# Patient Record
Sex: FEMALE | Race: WHITE | URBAN - METROPOLITAN AREA
[De-identification: names, ages, dates, MRNs, and addresses within clinical notes are randomized per-mention and may not be internally consistent; named-entity substitution may affect disease eponyms.]

---

## 2018-02-12 ENCOUNTER — OFFICE VISIT (OUTPATIENT)
Dept: ORTHOPEDICS | Facility: CLINIC | Age: 73
End: 2018-02-12
Payer: COMMERCIAL

## 2018-02-12 ENCOUNTER — RADIANT APPOINTMENT (OUTPATIENT)
Dept: GENERAL RADIOLOGY | Facility: CLINIC | Age: 73
End: 2018-02-12
Attending: FAMILY MEDICINE
Payer: COMMERCIAL

## 2018-02-12 VITALS
HEIGHT: 64 IN | BODY MASS INDEX: 30.73 KG/M2 | WEIGHT: 180 LBS | SYSTOLIC BLOOD PRESSURE: 152 MMHG | DIASTOLIC BLOOD PRESSURE: 69 MMHG

## 2018-02-12 DIAGNOSIS — M75.01 ADHESIVE CAPSULITIS OF RIGHT SHOULDER: ICD-10-CM

## 2018-02-12 DIAGNOSIS — M25.511 PAIN IN JOINT OF RIGHT SHOULDER: ICD-10-CM

## 2018-02-12 DIAGNOSIS — R29.898 SHOULDER WEAKNESS: ICD-10-CM

## 2018-02-12 DIAGNOSIS — M25.511 PAIN IN JOINT OF RIGHT SHOULDER: Primary | ICD-10-CM

## 2018-02-12 PROCEDURE — 73030 X-RAY EXAM OF SHOULDER: CPT | Mod: RT

## 2018-02-12 PROCEDURE — 99203 OFFICE O/P NEW LOW 30 MIN: CPT | Performed by: FAMILY MEDICINE

## 2018-02-12 NOTE — MR AVS SNAPSHOT
After Visit Summary   2/12/2018    Yvette Smallwood    MRN: [id number]           Patient Information     Date Of Birth          1945        Visit Information        Provider Department      2/12/2018 2:40 PM Fred Cramer MD [facility]        Today's Diagnoses     Pain in joint of right shoulder    -  1    Shoulder weakness        Adhesive capsulitis of right shoulder           Follow-ups after your visit        Your next 10 appointments already scheduled     Feb 13, 2018  2:00 PM CST   (Arrive by 1:45 PM)   MR SHOULDER RIGHT W/O CONTRAST with [id number]   [facility] ([facility])    [address]  [facility] [id number] [phone]           Take your medicines as usual, unless your doctor tells you not to. Bring a list of your current medicines to your exam (including vitamins, minerals and over-the-counter drugs). Also bring the results of similar scans you may have had.  Please remove any body piercings and hair extensions before you arrive.  Follow your doctor s orders. If you do not, we may have to postpone your exam.  You may or may not receive IV contrast for this exam pending the discretion of the Radiologist.  You do not need to do anything special to prepare.  The MRI machine uses a strong magnet. Please wear clothes without metal (snaps, zippers). A sweatsuit works well, or we may give you a hospital gown.   **IMPORTANT** THE INSTRUCTIONS BELOW ARE ONLY FOR THOSE PATIENTS WHO HAVE BEEN PRESCRIBED SEDATION OR GENERAL ANESTHESIA DURING THEIR MRI PROCEDURE:  IF YOUR DOCTOR PRESCRIBED ORAL SEDATION (take medicine to help you relax during your exam):   You must get the medicine from your doctor (oral medication) before you arrive. Bring the medicine to the exam. Do not take it at home. You ll be told when to take it upon arriving for your exam.   Arrive one hour early. Bring someone who can take  you home after the test. Your medicine will make you sleepy. After the exam, you may not drive, take a bus or take a taxi by yourself.  IF YOUR DOCTOR PRESCRIBED IV SEDATION:   Arrive one hour early. Bring someone who can take you home after the test. Your medicine will make you sleepy. After the exam, you may not drive, take a bus or take a taxi by yourself.   No eating 6 hours before your exam. You may have clear liquids up until 4 hours before your exam. (Clear liquids include water, clear tea, black coffee and fruit juice without pulp.)  IF YOUR DOCTOR PRESCRIBED ANESTHESIA (be asleep for your exam):   Arrive 1 1/2 hours early. Bring someone who can take you home after the test. You may not drive, take a bus or take a taxi by yourself.   No eating 8 hours before your exam. You may have clear liquids up until 4 hours before your exam. (Clear liquids include water, clear tea, black coffee and fruit juice without pulp.)   You will spend four to five hours in the recovery room.  Please call the Imaging Department at your exam site with any questions.              Future tests that were ordered for you today     Open Future Orders        Priority Expected Expires Ordered    MR Shoulder Right w/o Contrast Routine  2/12/2019 2/12/2018            Who to contact     If you have questions or need follow up information about today's clinic visit or your schedule please contact [facility] directly at [phone].  Normal or non-critical lab and imaging results will be communicated to you by MyChart, letter or phone within 4 business days after the clinic has received the results. If you do not hear from us within 7 days, please contact the clinic through [employer] or phone. If you have a critical or abnormal lab result, we will notify you by phone as soon as possible.  Submit refill requests through [employer] or call your pharmacy and they will forward the refill request to us. Please allow 3 business days  "for your refill to be completed.          Additional Information About Your Visit        Billingstreethart Information     [employer] lets you send messages to your doctor, view your test results, renew your prescriptions, schedule appointments and more. To sign up, go to [email or web address] . Click on \"Log in\" on the left side of the screen, which will take you to the Welcome page. Then click on \"Sign up Now\" on the right side of the page.     You will be asked to enter the access code listed below, as well as some personal information. Please follow the directions to create your username and password.     Your access code is: [email or web address]  Expires: 2018  6:26 PM     Your access code will  in 90 days. If you need help or a new code, please call your [place] clinic or [phone].        Care EveryWhere ID     This is your Care EveryWhere ID. This could be used by other organizations to access your [place] medical records  [id number]        Your Vitals Were     Height BMI (Body Mass Index)                5' 4\" (1.626 m) 30.9 kg/m2           Blood Pressure from Last 3 Encounters:   18 152/69    Weight from Last 3 Encounters:   18 180 lb (81.6 kg)               Primary Care Provider Fax #    Physician No Ref-Primary [phone]       No address on file        Equal Access to Services     REYNA CURRY AH: Hadii gianna whitt hadasho Sojimenezali, waaxda luqadaha, qaybta kaalmada michaelda, joel delarosa . So [facility] [phone].    ATENCIÓN: Si habla español, tiene a engle disposición servicios gratuitos de asistencia lingüística. Llame al [phone].    We comply with applicable federal civil rights laws and [place] laws. We do not discriminate on the basis of race, color, national origin, age, disability, sex, sexual orientation, or gender identity.            Thank you!     Thank you for choosing [facility]  for your care. Our goal is always to provide you with " excellent care. Hearing back from our patients is one way we can continue to improve our services. Please take a few minutes to complete the written survey that you may receive in the mail after your visit with us. Thank you!             Your Updated Medication List - Protect others around you: Learn how to safely use, store and throw away your medicines at www.disposemymeds.org.          This list is accurate as of 2/12/18  6:26 PM.  Always use your most recent med list.                   Brand Name Dispense Instructions for use Diagnosis    METFORMIN HCL PO           MULTIVITAMIN ADULT PO

## 2018-02-12 NOTE — PROGRESS NOTES
"[facility]   Clinic Visit s Feb 12, 2018    PCP: No Ref-Primary, Physician      Yvette is a 72 year old female who is seen as self referral for   Chief Complaint   Patient presents with     Shoulder Pain       Injury: Patient describes injury as tipping over and fell directly on right shoulder.      right-hand dominant    Location of Pain: right shoulder lateral, nonradiating   Duration of Pain: 1 month(s)  Rating of Pain at worst: 5/10  Rating of Pain Currently: 0/10  Pain is better with: rest   Pain is worse with: ADL's, lifting and driving   Treatment so far consists of: no treatment tried to date  Associated symptoms: no distal numbness or tingling; denies swelling or warmth  Recent imaging completed: No recent imaging completed.  Prior History of related problems: none    Social History: retired     PMH, PSHX, FMHX, SOCHX all reviewed and are unremarkable or noncontributory to today's visit.  See intake form for details.    Review of Systems   Musculoskeletal: Positive for joint pain.         Physical Exam  /69  Ht 5' 4\" (1.626 m)  Wt 180 lb (81.6 kg)  BMI 30.9 kg/m2  Constitutional:well-developed, well-nourished, and in no distress.   Cardiovascular: Intact distal pulses.    Neurological: alert. Gait Abnormal:   Antalgic gait  Skin: Skin is warm and dry.   Psychiatric: Mood and affect normal.   Respiratory: unlabored, speaks in full sentences  Lymph: no LAD, no lymphangitis      Left Shoulder Exam     Tenderness   None    Range of Motion   Active Abduction:                       170  Passive Abduction:                    170  Extension:                                  Normal  Forward Flexion:                        170  External Rotation:                      Normal  Internal Rotation 0 degrees:      Normal  Internal Rotation 90 degrees:    Normal    Muscle Strength   Abduction:            5/5  Internal Rotation:  5/5  External Rotation: 5/5  Supraspinatus:     " 5/5  Subscapularis:     5/5  Biceps:                 5/5    Tests   Impingement:   Negative  Carvajal:          Negative  Cross Arm:      Negative  Drop Arm:        Negative  Apprehension: Negative  Sulcus:            Negative    Right Shoulder Exam     Range of Motion   Active Abduction:                       60  Passive Abduction:                    60  Forward Flexion:                        80          X-ray images Ordered and independently reviewed by me in the office today with the patient. X-ray shows: mild degenerative changes, high riding humerus.    Recent Results (from the past 24 hour(s))   XR Shoulder Right G/E 3 Views    Narrative    RIGHT SHOULDER THREE OR MORE VIEWS  2/12/2018  3:36 PM     HISTORY: Pain in joint of right shoulder.    COMPARISON: None.      Impression    IMPRESSION: No acute bony abnormality. Mild hypertrophic degenerative  change at the acromioclavicular joint.    RADHA AVILA MD     ASSESSMENT/PLAN    ICD-10-CM    1. Pain in joint of right shoulder M25.511 XR Shoulder Right G/E 3 Views     MR Shoulder Right w/o Contrast   2. Shoulder weakness R29.898 MR Shoulder Right w/o Contrast   3. Adhesive capsulitis of right shoulder M75.01 MR Shoulder Right w/o Contrast     Una has substantial weakness of the right arm but also significant loss of motion.  Given onset related to trauma, acute rotator cuff tear is a strong possibility.  Muscle range of motion could be from disuse or secondary frozen shoulder.  Primary frozen shoulder also possibility.  MRI recommended to further evaluate for rotator cuff tear, will call with results as I suspect surgical consultation will be necessary.  Patient indicates understanding

## 2018-02-12 NOTE — LETTER
"    2/12/2018         RE: Yvette Smallwood  [address] [facility] [address]        Dear Colleague,    Thank you for referring your patient, Yvette Smallwood, to the [facility]. Please see a copy of my visit note below.    [facility]   Clinic Visit s Feb 12, 2018    PCP: No Ref-Primary, Physician      Yvette is a 72 year old female who is seen as self referral for   Chief Complaint   Patient presents with     Shoulder Pain       Injury: Patient describes injury as tipping over and fell directly on right shoulder.      right-hand dominant    Location of Pain: right shoulder lateral, nonradiating   Duration of Pain: 1 month(s)  Rating of Pain at worst: 5/10  Rating of Pain Currently: 0/10  Pain is better with: rest   Pain is worse with: ADL's, lifting and driving   Treatment so far consists of: no treatment tried to date  Associated symptoms: no distal numbness or tingling; denies swelling or warmth  Recent imaging completed: No recent imaging completed.  Prior History of related problems: none    Social History: retired     PMH, PSHX, FMHX, SOCHX all reviewed and are unremarkable or noncontributory to today's visit.  See intake form for details.    Review of Systems   Musculoskeletal: Positive for joint pain.         Physical Exam  /69  Ht 5' 4\" (1.626 m)  Wt 180 lb (81.6 kg)  BMI 30.9 kg/m2  Constitutional:well-developed, well-nourished, and in no distress.   Cardiovascular: Intact distal pulses.    Neurological: alert. Gait Abnormal:   Antalgic gait  Skin: Skin is warm and dry.   Psychiatric: Mood and affect normal.   Respiratory: unlabored, speaks in full sentences  Lymph: no LAD, no lymphangitis      Left Shoulder Exam     Tenderness   None    Range of Motion   Active Abduction:                       170  Passive Abduction:                    170  Extension:                                  Normal  Forward Flexion:                        " 170  External Rotation:                      Normal  Internal Rotation 0 degrees:      Normal  Internal Rotation 90 degrees:    Normal    Muscle Strength   Abduction:            5/5  Internal Rotation:  5/5  External Rotation: 5/5  Supraspinatus:     5/5  Subscapularis:     5/5  Biceps:                 5/5    Tests   Impingement:   Negative  Carvajal:          Negative  Cross Arm:      Negative  Drop Arm:        Negative  Apprehension: Negative  Sulcus:            Negative    Right Shoulder Exam     Range of Motion   Active Abduction:                       60  Passive Abduction:                    60  Forward Flexion:                        80          X-ray images Ordered and independently reviewed by me in the office today with the patient. X-ray shows: mild degenerative changes, high riding humerus.    Recent Results (from the past 24 hour(s))   XR Shoulder Right G/E 3 Views    Narrative    RIGHT SHOULDER THREE OR MORE VIEWS  2/12/2018  3:36 PM     HISTORY: Pain in joint of right shoulder.    COMPARISON: None.      Impression    IMPRESSION: No acute bony abnormality. Mild hypertrophic degenerative  change at the acromioclavicular joint.    RADHA AVILA MD     ASSESSMENT/PLAN    ICD-10-CM    1. Pain in joint of right shoulder M25.511 XR Shoulder Right G/E 3 Views     MR Shoulder Right w/o Contrast   2. Shoulder weakness R29.898 MR Shoulder Right w/o Contrast   3. Adhesive capsulitis of right shoulder M75.01 MR Shoulder Right w/o Contrast     Una has substantial weakness of the right arm but also significant loss of motion.  Given onset related to trauma, acute rotator cuff tear is a strong possibility.  Muscle range of motion could be from disuse or secondary frozen shoulder.  Primary frozen shoulder also possibility.  MRI recommended to further evaluate for rotator cuff tear, will call with results as I suspect surgical consultation will be necessary.  Patient indicates understanding    Again, thank you for  allowing me to participate in the care of your patient.        Sincerely,        Fred Cramer MD

## 2018-02-13 ENCOUNTER — HOSPITAL ENCOUNTER (OUTPATIENT)
Dept: MRI IMAGING | Facility: CLINIC | Age: 73
Discharge: HOME | End: 2018-02-13
Attending: FAMILY MEDICINE | Admitting: FAMILY MEDICINE
Payer: MEDICARE

## 2018-02-13 DIAGNOSIS — M25.511 PAIN IN JOINT OF RIGHT SHOULDER: ICD-10-CM

## 2018-02-13 DIAGNOSIS — M75.01 ADHESIVE CAPSULITIS OF RIGHT SHOULDER: ICD-10-CM

## 2018-02-13 DIAGNOSIS — R29.898 SHOULDER WEAKNESS: ICD-10-CM

## 2018-02-13 PROCEDURE — 73221 MRI JOINT UPR EXTREM W/O DYE: CPT | Mod: RT

## 2018-02-19 ENCOUNTER — TELEPHONE (OUTPATIENT)
Dept: ORTHOPEDICS | Facility: CLINIC | Age: 73
End: 2018-02-19

## 2018-02-19 DIAGNOSIS — S46.011A TRAUMATIC ROTATOR CUFF TEAR, RIGHT, INITIAL ENCOUNTER: Primary | ICD-10-CM

## 2018-02-19 NOTE — TELEPHONE ENCOUNTER
Patient called inquiring about results of R shoulder MRI.    Plan for results from last OV: call patient  Preferred contact number: Home    IMPRESSION:   1. Full-thickness tear of essentially the entire distal supraspinatus  tendon. There is prominent tendinosis of the adjacent infraspinatus.  2. Prominent degenerative changes of the acromioclavicular joint with  mild degenerative changes of the glenohumeral joint.    Please advise.     NEIL Acosta RN

## 2018-02-19 NOTE — TELEPHONE ENCOUNTER
Notified of RCT, slight retraction, given acute onset suspect acute tear, repair a reasonable option, referred to surgery, pt agrees.

## 2018-02-21 ENCOUNTER — OFFICE VISIT (OUTPATIENT)
Dept: ORTHOPEDICS | Facility: CLINIC | Age: 73
End: 2018-02-21
Payer: COMMERCIAL

## 2018-02-21 ENCOUNTER — TELEPHONE (OUTPATIENT)
Dept: ORTHOPEDICS | Facility: CLINIC | Age: 73
End: 2018-02-21

## 2018-02-21 VITALS
DIASTOLIC BLOOD PRESSURE: 68 MMHG | HEIGHT: 64 IN | BODY MASS INDEX: 30.73 KG/M2 | SYSTOLIC BLOOD PRESSURE: 142 MMHG | WEIGHT: 180 LBS

## 2018-02-21 DIAGNOSIS — M75.121 COMPLETE TEAR OF RIGHT ROTATOR CUFF: Primary | ICD-10-CM

## 2018-02-21 PROCEDURE — 99204 OFFICE O/P NEW MOD 45 MIN: CPT | Performed by: ORTHOPAEDIC SURGERY

## 2018-02-21 RX ORDER — ASPIRIN 81 MG/1
162 TABLET ORAL DAILY
COMMUNITY
Start: 2016-09-29

## 2018-02-21 RX ORDER — GLIPIZIDE 5 MG/1
5 TABLET ORAL
COMMUNITY
Start: 2017-10-07

## 2018-02-21 NOTE — TELEPHONE ENCOUNTER
Scheduled surgery for Right shoulder open Decompression, Distal Clavicle Resection, Rotator cuff tear repair on 3/20/2018 with Dr. Raygoza @ [facility] @ 12:00.  Surgery education packet provided to patient.

## 2018-02-21 NOTE — NURSING NOTE
"Chief Complaint   Patient presents with     Shoulder Pain     Right shoulder, rotator cuff tear       Initial /68 (BP Location: Right arm, Patient Position: Sitting, Cuff Size: Adult Regular)  Ht 5' 4\" (1.626 m)  Wt 180 lb (81.6 kg)  BMI 30.9 kg/m2 Estimated body mass index is 30.9 kg/(m^2) as calculated from the following:    Height as of this encounter: 5' 4\" (1.626 m).    Weight as of this encounter: 180 lb (81.6 kg).  Medication Reconciliation: complete    "

## 2018-02-21 NOTE — LETTER
2/21/2018         RE: Yvette Smallwood  [address] [facility] [address]        Dear Colleague,    Thank you for referring your patient, Yvette Smallwood, to the [facility]. Please see a copy of my visit note below.    HISTORY OF PRESENT ILLNESS:    Yvette Smallwood is a 72 year old female who is seen in consultation at the request of Dr. Cramer for right shoulder, rotator cuff tear.    Present symptoms: DOI: 1/13/2018 ~ 5.5 weeks ago.  AILIN: turned to the side and fell over landing on the right shoulder.  Pt states pain is in the upper arm, can not raise her arm.  States she doesn't have much pain with it but she is lacking motion.  She was not aware of having any issues with that shoulder prior to the fall injury. She is right-hand dominant. She denies radiculopathy type of symptoms.  Treatments tried to this point: MRI, ibuprofen  Orthopedic PMH: no ortho surgeries     No past medical history on file.  Diabetes    History reviewed. No pertinent surgical history. None    Family History   Problem Relation Age of Onset     DIABETES Maternal Grandfather        Social History     Social History     Marital status:      Spouse name: N/A     Number of children: N/A     Years of education: N/A     Occupational History     Not on file.     Social History Main Topics     Smoking status: Never Smoker     Smokeless tobacco: Never Used     Alcohol use No     Drug use: Not on file     Sexual activity: Not on file     Other Topics Concern     Not on file     Social History Narrative       Current Outpatient Prescriptions   Medication Sig Dispense Refill     glipiZIDE (GLUCOTROL) 5 MG tablet Take 5 mg by mouth       cyanocobalamin (RA VITAMIN B-12 TR) 1000 MCG TBCR Take 1,000 mcg by mouth       metFORMIN (GLUCOPHAGE) 1000 MG tablet Take 1,000 mg by mouth       aspirin EC 81 MG EC tablet Take 81 mg by mouth       VITAMIN E BLEND PO        Ascorbic Acid (VITAMIN C PO)        Multiple  "Vitamins-Minerals (MULTIVITAMIN ADULT PO)        [DISCONTINUED] METFORMIN HCL PO          No Known Allergies    REVIEW OF SYSTEMS:  CONSTITUTIONAL:  NEGATIVE for fever, chills, change in weight  INTEGUMENTARY/SKIN:  NEGATIVE for worrisome rashes, moles or lesions  EYES:  NEGATIVE for vision changes or irritation  ENT/MOUTH:  NEGATIVE for ear, mouth and throat problems  RESP:  NEGATIVE for significant cough or SOB  BREAST:  NEGATIVE for masses, tenderness or discharge  CV:  NEGATIVE for chest pain, palpitations or peripheral edema  GI:  diarrhea  :  Negative   MUSCULOSKELETAL:  See HPI above  NEURO:  NEGATIVE for weakness, dizziness or paresthesias  ENDOCRINE:  NEGATIVE for temperature intolerance, skin/hair changes  HEME/ALLERGY/IMMUNE:  NEGATIVE for bleeding problems  PSYCHIATRIC:  NEGATIVE for changes in mood or affect      PHYSICAL EXAM:  /68 (BP Location: Right arm, Patient Position: Sitting, Cuff Size: Adult Regular)  Ht 5' 4\" (1.626 m)  Wt 180 lb (81.6 kg)  BMI 30.9 kg/m2  Body mass index is 30.9 kg/(m^2).   GENERAL APPEARANCE: healthy, alert and no distress   HEENT: No apparent thyroid megaly. Clear sclera with normal ocular movement  RESPIRATORY: No labored breathing  SKIN: no suspicious lesions or rashes  NEURO: Normal strength and tone, mentation intact and speech normal  VASCULAR: Good pulses, and capillary refill   LYMPH: no lymphadenopathy   PSYCH:  mentation appears normal and affect normal/bright    MUSCULOSKELETAL:  She walks with a cane  Neck range of motion is well maintained  Full but painful range of motion, right  Positive impingement sign, right  Positive arm drop test, right  Negative belly press test, right  Virtually no significant strength of external rotation, right  Elbow range of motion is full  Distal neurovascular status is intact       ASSESSMENT:  Large rotator cuff tear with chronic impingement syndrome and acromioclavicular joint DJD  Mild glenohumeral joint " DJD    PLAN:  The images of MRI scan from February 12, 2018 were visualized. Findings were thoroughly explained. No Significant Supraspinatus atrophy is noted although diffuse tendinopathy/tendinosis is present.  We discussed the options of further observation versus decompression and repair versus reverse total shoulder arthroplasty.  With absence of significant muscular atrophy despite large rotator cuff tear, an exploration and attempt of rotator cuff tear repair with an augment and using abduction brace would be a very reasonable option.  Possibility of not being able to repair and long recovery time as well as potential complications were thoroughly discussed.  She wants to proceed with surgery in about a month So that her  was recovering from a stroke can drive. All the questions were answered.  The proposed surgery will include open shoulder decompression, distal clavicle resection, rotator cuff tear repair..      Imaging Interpretation:     Recent Results (from the past 744 hour(s))   XR Shoulder Right G/E 3 Views    Narrative    RIGHT SHOULDER THREE OR MORE VIEWS  2/12/2018  3:36 PM     HISTORY: Pain in joint of right shoulder.    COMPARISON: None.      Impression    IMPRESSION: No acute bony abnormality. Mild hypertrophic degenerative  change at the acromioclavicular joint.    RADHA AVILA MD   MR Shoulder Right w/o Contrast    Narrative    MRI RIGHT SHOULDER WITHOUT CONTRAST  2/13/2018 2:40 PM    HISTORY: Right shoulder pain and difficulty elevating the arm since  1/13/2018 after falling. Evaluate for rotator cuff tear.    COMPARISON: Radiographs on 2/12/2018.    TECHNIQUE: Multiplanar MR imaging was performed without contrast.    FINDINGS:  Osseous acromion outlet: There is a type II configuration of the  acromion with no significant lateral or anterior downsloping. There  are moderate to marked degenerative changes of the acromioclavicular  joint. The outlet is widely patent. No os  acromiale.    Rotator cuff: There is a full-thickness tear of what appears to be the  entire distal supraspinatus tendon at its insertion into the greater  tuberosity with retraction to the humeral head apex. There is  prominent tendinosis of the adjacent superior aspect of the distal  infraspinatus tendon. No other rotator cuff tear is seen. No fatty  atrophy of the musculature is demonstrated.     Labrum: No tear or other labral pathology is demonstrated.    Biceps tendon: The biceps tendon is in the bicipital groove. It is  intact and demonstrates normal signal.    Osseous structures and cartilaginous surfaces: No fracture or osseous  lesion is seen. There is moderate resorptive change in the greater  tuberosity. No other abnormal marrow signal intensity is seen. There  are mild degenerative changes in the glenohumeral articulation with  grade II chondromalacia.    Additional findings: There is a moderate-sized glenohumeral joint  effusion. The fluid extends through the rotator cuff tear into the  subacromial space and subdeltoid bursa. The adjacent soft tissues are  unremarkable.      Impression    IMPRESSION:   1. Full-thickness tear of essentially the entire distal supraspinatus  tendon. There is prominent tendinosis of the adjacent infraspinatus.  2. Prominent degenerative changes of the acromioclavicular joint with  mild degenerative changes of the glenohumeral joint.    MD Shiv SILVA MD  Department of Orthopedic Surgery        Disclaimer: This note consists of symbols derived from keyboarding, dictation and/or voice recognition software. As a result, there may be errors in the script that have gone undetected. Please consider this when interpreting information found in this chart.      Again, thank you for allowing me to participate in the care of your patient.        Sincerely,        Shiv Raygoza MD

## 2018-02-21 NOTE — PROGRESS NOTES
HISTORY OF PRESENT ILLNESS:    Yvette Smallwood is a 72 year old female who is seen in consultation at the request of Dr. Cramer for right shoulder, rotator cuff tear.    Present symptoms: DOI: 1/13/2018 ~ 5.5 weeks ago.  AILIN: turned to the side and fell over landing on the right shoulder.  Pt states pain is in the upper arm, can not raise her arm.  States she doesn't have much pain with it but she is lacking motion.  She was not aware of having any issues with that shoulder prior to the fall injury. She is right-hand dominant. She denies radiculopathy type of symptoms.  Treatments tried to this point: MRI, ibuprofen  Orthopedic PMH: no ortho surgeries     No past medical history on file.  Diabetes    History reviewed. No pertinent surgical history. None    Family History   Problem Relation Age of Onset     DIABETES Maternal Grandfather        Social History     Social History     Marital status:      Spouse name: N/A     Number of children: N/A     Years of education: N/A     Occupational History     Not on file.     Social History Main Topics     Smoking status: Never Smoker     Smokeless tobacco: Never Used     Alcohol use No     Drug use: Not on file     Sexual activity: Not on file     Other Topics Concern     Not on file     Social History Narrative       Current Outpatient Prescriptions   Medication Sig Dispense Refill     glipiZIDE (GLUCOTROL) 5 MG tablet Take 5 mg by mouth       cyanocobalamin (RA VITAMIN B-12 TR) 1000 MCG TBCR Take 1,000 mcg by mouth       metFORMIN (GLUCOPHAGE) 1000 MG tablet Take 1,000 mg by mouth       aspirin EC 81 MG EC tablet Take 81 mg by mouth       VITAMIN E BLEND PO        Ascorbic Acid (VITAMIN C PO)        Multiple Vitamins-Minerals (MULTIVITAMIN ADULT PO)        [DISCONTINUED] METFORMIN HCL PO          No Known Allergies    REVIEW OF SYSTEMS:  CONSTITUTIONAL:  NEGATIVE for fever, chills, change in weight  INTEGUMENTARY/SKIN:  NEGATIVE for worrisome rashes, moles or  "lesions  EYES:  NEGATIVE for vision changes or irritation  ENT/MOUTH:  NEGATIVE for ear, mouth and throat problems  RESP:  NEGATIVE for significant cough or SOB  BREAST:  NEGATIVE for masses, tenderness or discharge  CV:  NEGATIVE for chest pain, palpitations or peripheral edema  GI:  diarrhea  :  Negative   MUSCULOSKELETAL:  See HPI above  NEURO:  NEGATIVE for weakness, dizziness or paresthesias  ENDOCRINE:  NEGATIVE for temperature intolerance, skin/hair changes  HEME/ALLERGY/IMMUNE:  NEGATIVE for bleeding problems  PSYCHIATRIC:  NEGATIVE for changes in mood or affect      PHYSICAL EXAM:  /68 (BP Location: Right arm, Patient Position: Sitting, Cuff Size: Adult Regular)  Ht 5' 4\" (1.626 m)  Wt 180 lb (81.6 kg)  BMI 30.9 kg/m2  Body mass index is 30.9 kg/(m^2).   GENERAL APPEARANCE: healthy, alert and no distress   HEENT: No apparent thyroid megaly. Clear sclera with normal ocular movement  RESPIRATORY: No labored breathing  SKIN: no suspicious lesions or rashes  NEURO: Normal strength and tone, mentation intact and speech normal  VASCULAR: Good pulses, and capillary refill   LYMPH: no lymphadenopathy   PSYCH:  mentation appears normal and affect normal/bright    MUSCULOSKELETAL:  She walks with a cane  Neck range of motion is well maintained  Full but painful range of motion, right  Positive impingement sign, right  Positive arm drop test, right  Negative belly press test, right  Virtually no significant strength of external rotation, right  Elbow range of motion is full  Distal neurovascular status is intact       ASSESSMENT:  Large rotator cuff tear with chronic impingement syndrome and acromioclavicular joint DJD  Mild glenohumeral joint DJD    PLAN:  The images of MRI scan from February 12, 2018 were visualized. Findings were thoroughly explained. No Significant Supraspinatus atrophy is noted although diffuse tendinopathy/tendinosis is present.  We discussed the options of further observation versus " decompression and repair versus reverse total shoulder arthroplasty.  With absence of significant muscular atrophy despite large rotator cuff tear, an exploration and attempt of rotator cuff tear repair with an augment and using abduction brace would be a very reasonable option.  Possibility of not being able to repair and long recovery time as well as potential complications were thoroughly discussed.  She wants to proceed with surgery in about a month So that her  was recovering from a stroke can drive. All the questions were answered.  The proposed surgery will include open shoulder decompression, distal clavicle resection, rotator cuff tear repair..      Imaging Interpretation:     Recent Results (from the past 744 hour(s))   XR Shoulder Right G/E 3 Views    Narrative    RIGHT SHOULDER THREE OR MORE VIEWS  2/12/2018  3:36 PM     HISTORY: Pain in joint of right shoulder.    COMPARISON: None.      Impression    IMPRESSION: No acute bony abnormality. Mild hypertrophic degenerative  change at the acromioclavicular joint.    RADHA AVILA MD   MR Shoulder Right w/o Contrast    Narrative    MRI RIGHT SHOULDER WITHOUT CONTRAST  2/13/2018 2:40 PM    HISTORY: Right shoulder pain and difficulty elevating the arm since  1/13/2018 after falling. Evaluate for rotator cuff tear.    COMPARISON: Radiographs on 2/12/2018.    TECHNIQUE: Multiplanar MR imaging was performed without contrast.    FINDINGS:  Osseous acromion outlet: There is a type II configuration of the  acromion with no significant lateral or anterior downsloping. There  are moderate to marked degenerative changes of the acromioclavicular  joint. The outlet is widely patent. No os acromiale.    Rotator cuff: There is a full-thickness tear of what appears to be the  entire distal supraspinatus tendon at its insertion into the greater  tuberosity with retraction to the humeral head apex. There is  prominent tendinosis of the adjacent superior aspect of the  distal  infraspinatus tendon. No other rotator cuff tear is seen. No fatty  atrophy of the musculature is demonstrated.     Labrum: No tear or other labral pathology is demonstrated.    Biceps tendon: The biceps tendon is in the bicipital groove. It is  intact and demonstrates normal signal.    Osseous structures and cartilaginous surfaces: No fracture or osseous  lesion is seen. There is moderate resorptive change in the greater  tuberosity. No other abnormal marrow signal intensity is seen. There  are mild degenerative changes in the glenohumeral articulation with  grade II chondromalacia.    Additional findings: There is a moderate-sized glenohumeral joint  effusion. The fluid extends through the rotator cuff tear into the  subacromial space and subdeltoid bursa. The adjacent soft tissues are  unremarkable.      Impression    IMPRESSION:   1. Full-thickness tear of essentially the entire distal supraspinatus  tendon. There is prominent tendinosis of the adjacent infraspinatus.  2. Prominent degenerative changes of the acromioclavicular joint with  mild degenerative changes of the glenohumeral joint.    MD Shiv SILVA MD  Department of Orthopedic Surgery        Disclaimer: This note consists of symbols derived from keyboarding, dictation and/or voice recognition software. As a result, there may be errors in the script that have gone undetected. Please consider this when interpreting information found in this chart.

## 2018-02-21 NOTE — MR AVS SNAPSHOT
"              After Visit Summary   2/21/2018    Yvette Smallwood    MRN: [id number]           Patient Information     Date Of Birth          1945        Visit Information        Provider Department      2/21/2018 9:20 AM Shiv Raygoza MD [facility] ORTHOPEDIC SURGERY        Today's Diagnoses     Complete tear of right rotator cuff    -  1       Follow-ups after your visit        Your next 10 appointments already scheduled     Mar 20, 2018   Procedure with Shiv Raygoza MD   [facility] PeriOp [facility] (--)    [address] [facility] [id number] [phone]              Who to contact     If you have questions or need follow up information about today's clinic visit or your schedule please contact [facility] ORTHOPEDIC SURGERY directly at [phone].  Normal or non-critical lab and imaging results will be communicated to you by MyChart, letter or phone within 4 business days after the clinic has received the results. If you do not hear from us within 7 days, please contact the clinic through MyChart or phone. If you have a critical or abnormal lab result, we will notify you by phone as soon as possible.  Submit refill requests through [employer] or call your pharmacy and they will forward the refill request to us. Please allow 3 business days for your refill to be completed.          Additional Information About Your Visit        [employer] Information     [employer] lets you send messages to your doctor, view your test results, renew your prescriptions, schedule appointments and more. To sign up, go to [email or web address] . Click on \"Log in\" on the left side of the screen, which will take you to the Welcome page. Then click on \"Sign up Now\" on the right side of the page.     You will be asked to enter the access code listed below, as well as some personal information. Please follow the directions to create your username and password.     Your access code is: " [email or web address]  Expires: 2018  6:26 PM     Your access code will  in 90 days. If you need help or a new code, please call your [facility] clinic or [phone].        Care EveryWhere ID     This is your Care EveryWhere ID. This could be used by other organizations to access your [facility] medical records  [id number]        Your Vitals Were     Height BMI (Body Mass Index)                5' 4\" (1.626 m) 30.9 kg/m2           Blood Pressure from Last 3 Encounters:   18 142/68   18 152/69    Weight from Last 3 Encounters:   18 180 lb (81.6 kg)   18 180 lb (81.6 kg)              Today, you had the following     No orders found for display         Today's Medication Changes          These changes are accurate as of 18 10:15 AM.  If you have any questions, ask your nurse or doctor.               These medicines have changed or have updated prescriptions.        Dose/Directions    metFORMIN 1000 MG tablet   Commonly known as:  GLUCOPHAGE   This may have changed:  Another medication with the same name was removed. Continue taking this medication, and follow the directions you see here.   Changed by:  Shiv Raygoza MD        Dose:  1000 mg   Take 1,000 mg by mouth   Refills:  0                Primary Care Provider Office Phone # Fax #    [facility] [phone]       [address] [place] [address]        Equal Access to Services     KIRTI CURRY AH: Hadii aad ku hadasho Sodiaz, waaxda luqadaha, qaybta kaalmada adeegyada, joel delarosa . So [facility] [phone].    ATENCIÓN: Si habla español, tiene a engle disposición servicios gratuitos de asistencia lingüística. Llame al [phone].    We comply with applicable federal civil rights laws and [place] laws. We do not discriminate on the basis of race, color, national origin, age, disability, sex, sexual orientation, or gender identity.            Thank you!     Thank you for " choosing [facility] ORTHOPEDIC SURGERY  for your care. Our goal is always to provide you with excellent care. Hearing back from our patients is one way we can continue to improve our services. Please take a few minutes to complete the written survey that you may receive in the mail after your visit with us. Thank you!             Your Updated Medication List - Protect others around you: Learn how to safely use, store and throw away your medicines at www.disposemymeds.org.          This list is accurate as of 2/21/18 10:15 AM.  Always use your most recent med list.                   Brand Name Dispense Instructions for use Diagnosis    aspirin EC 81 MG EC tablet      Take 81 mg by mouth        glipiZIDE 5 MG tablet    GLUCOTROL     Take 5 mg by mouth        metFORMIN 1000 MG tablet    GLUCOPHAGE     Take 1,000 mg by mouth        MULTIVITAMIN ADULT PO           RA VITAMIN B-12 TR 1000 MCG Tbcr   Generic drug:  cyanocobalamin      Take 1,000 mcg by mouth        VITAMIN C PO           VITAMIN E BLEND PO

## 2018-03-15 ENCOUNTER — TRANSFERRED RECORDS (OUTPATIENT)
Dept: HEALTH INFORMATION MANAGEMENT | Facility: CLINIC | Age: 73
End: 2018-03-15

## 2018-03-20 ENCOUNTER — ANESTHESIA EVENT (OUTPATIENT)
Dept: SURGERY | Facility: CLINIC | Age: 73
End: 2018-03-20
Payer: MEDICARE

## 2018-03-20 ENCOUNTER — ANESTHESIA (OUTPATIENT)
Dept: SURGERY | Facility: CLINIC | Age: 73
End: 2018-03-20
Payer: MEDICARE

## 2018-03-20 ENCOUNTER — HOSPITAL ENCOUNTER (OUTPATIENT)
Facility: CLINIC | Age: 73
Discharge: HOME | End: 2018-03-20
Attending: ORTHOPAEDIC SURGERY | Admitting: ORTHOPAEDIC SURGERY
Payer: MEDICARE

## 2018-03-20 ENCOUNTER — SURGERY (OUTPATIENT)
Age: 73
End: 2018-03-20

## 2018-03-20 VITALS
BODY MASS INDEX: 31.29 KG/M2 | RESPIRATION RATE: 16 BRPM | WEIGHT: 183.3 LBS | SYSTOLIC BLOOD PRESSURE: 155 MMHG | TEMPERATURE: 98.2 F | OXYGEN SATURATION: 98 % | DIASTOLIC BLOOD PRESSURE: 75 MMHG | HEIGHT: 64 IN

## 2018-03-20 DIAGNOSIS — G89.18 POST-OP PAIN: Primary | ICD-10-CM

## 2018-03-20 LAB
CREAT SERPL-MCNC: 0.46 MG/DL (ref 0.52–1.04)
GFR SERPL CREATININE-BSD FRML MDRD: >90 ML/MIN/1.7M2
GLUCOSE BLDC GLUCOMTR-MCNC: 145 MG/DL (ref 70–99)
GLUCOSE BLDC GLUCOMTR-MCNC: 172 MG/DL (ref 70–99)
POTASSIUM SERPL-SCNC: 3.9 MMOL/L (ref 3.4–5.3)

## 2018-03-20 PROCEDURE — 37000009 ZZH ANESTHESIA TECHNICAL FEE, EACH ADDTL 15 MIN: Performed by: ORTHOPAEDIC SURGERY

## 2018-03-20 PROCEDURE — A9270 NON-COVERED ITEM OR SERVICE: HCPCS | Mod: GY | Performed by: ORTHOPAEDIC SURGERY

## 2018-03-20 PROCEDURE — 40000171 ZZH STATISTIC PRE-PROCEDURE ASSESSMENT III: Performed by: ORTHOPAEDIC SURGERY

## 2018-03-20 PROCEDURE — 23412 REPAIR ROTATOR CUFF CHRONIC: CPT | Mod: AS | Performed by: PHYSICIAN ASSISTANT

## 2018-03-20 PROCEDURE — 25000132 ZZH RX MED GY IP 250 OP 250 PS 637: Mod: GY | Performed by: ORTHOPAEDIC SURGERY

## 2018-03-20 PROCEDURE — 23130 ACROMP/ACROMIONECTOMY PRTL: CPT | Mod: AS | Performed by: PHYSICIAN ASSISTANT

## 2018-03-20 PROCEDURE — 23120 CLAVICULECTOMY PARTIAL: CPT | Mod: AS | Performed by: PHYSICIAN ASSISTANT

## 2018-03-20 PROCEDURE — 84132 ASSAY OF SERUM POTASSIUM: CPT | Performed by: ANESTHESIOLOGY

## 2018-03-20 PROCEDURE — 25000125 ZZHC RX 250: Performed by: ORTHOPAEDIC SURGERY

## 2018-03-20 PROCEDURE — A9270 NON-COVERED ITEM OR SERVICE: HCPCS | Mod: GY | Performed by: ANESTHESIOLOGY

## 2018-03-20 PROCEDURE — 25000128 H RX IP 250 OP 636: Performed by: NURSE ANESTHETIST, CERTIFIED REGISTERED

## 2018-03-20 PROCEDURE — 25000128 H RX IP 250 OP 636: Performed by: ANESTHESIOLOGY

## 2018-03-20 PROCEDURE — 27210794 ZZH OR GENERAL SUPPLY STERILE: Performed by: ORTHOPAEDIC SURGERY

## 2018-03-20 PROCEDURE — 23120 CLAVICULECTOMY PARTIAL: CPT | Mod: 59 | Performed by: ORTHOPAEDIC SURGERY

## 2018-03-20 PROCEDURE — 71000027 ZZH RECOVERY PHASE 2 EACH 15 MINS: Performed by: ORTHOPAEDIC SURGERY

## 2018-03-20 PROCEDURE — 71000012 ZZH RECOVERY PHASE 1 LEVEL 1 FIRST HR: Performed by: ORTHOPAEDIC SURGERY

## 2018-03-20 PROCEDURE — 82962 GLUCOSE BLOOD TEST: CPT

## 2018-03-20 PROCEDURE — 25000566 ZZH SEVOFLURANE, EA 15 MIN: Performed by: ORTHOPAEDIC SURGERY

## 2018-03-20 PROCEDURE — C1713 ANCHOR/SCREW BN/BN,TIS/BN: HCPCS | Performed by: ORTHOPAEDIC SURGERY

## 2018-03-20 PROCEDURE — 27210995 ZZH RX 272: Performed by: ORTHOPAEDIC SURGERY

## 2018-03-20 PROCEDURE — 71000013 ZZH RECOVERY PHASE 1 LEVEL 1 EA ADDTL HR: Performed by: ORTHOPAEDIC SURGERY

## 2018-03-20 PROCEDURE — 23412 REPAIR ROTATOR CUFF CHRONIC: CPT | Mod: RT | Performed by: ORTHOPAEDIC SURGERY

## 2018-03-20 PROCEDURE — 93010 ELECTROCARDIOGRAM REPORT: CPT | Performed by: INTERNAL MEDICINE

## 2018-03-20 PROCEDURE — 36415 COLL VENOUS BLD VENIPUNCTURE: CPT | Performed by: ANESTHESIOLOGY

## 2018-03-20 PROCEDURE — 25000132 ZZH RX MED GY IP 250 OP 250 PS 637: Mod: GY | Performed by: ANESTHESIOLOGY

## 2018-03-20 PROCEDURE — 37000008 ZZH ANESTHESIA TECHNICAL FEE, 1ST 30 MIN: Performed by: ORTHOPAEDIC SURGERY

## 2018-03-20 PROCEDURE — 25000128 H RX IP 250 OP 636: Performed by: ORTHOPAEDIC SURGERY

## 2018-03-20 PROCEDURE — 25000131 ZZH RX MED GY IP 250 OP 636 PS 637: Mod: GY | Performed by: NURSE ANESTHETIST, CERTIFIED REGISTERED

## 2018-03-20 PROCEDURE — 25000125 ZZHC RX 250: Performed by: NURSE ANESTHETIST, CERTIFIED REGISTERED

## 2018-03-20 PROCEDURE — 23130 ACROMP/ACROMIONECTOMY PRTL: CPT | Mod: 59 | Performed by: ORTHOPAEDIC SURGERY

## 2018-03-20 PROCEDURE — 27110028 ZZH OR GENERAL SUPPLY NON-STERILE: Performed by: ORTHOPAEDIC SURGERY

## 2018-03-20 PROCEDURE — 36000093 ZZH SURGERY LEVEL 4 1ST 30 MIN: Performed by: ORTHOPAEDIC SURGERY

## 2018-03-20 PROCEDURE — 36000063 ZZH SURGERY LEVEL 4 EA 15 ADDTL MIN: Performed by: ORTHOPAEDIC SURGERY

## 2018-03-20 PROCEDURE — 82565 ASSAY OF CREATININE: CPT | Performed by: ANESTHESIOLOGY

## 2018-03-20 DEVICE — IMPLANTABLE DEVICE: Type: IMPLANTABLE DEVICE | Site: SHOULDER | Status: FUNCTIONAL

## 2018-03-20 RX ORDER — ACETAMINOPHEN 325 MG/1
975 TABLET ORAL ONCE
Status: DISCONTINUED | OUTPATIENT
Start: 2018-03-20 | End: 2018-03-20 | Stop reason: HOSPADM

## 2018-03-20 RX ORDER — PROPOFOL 10 MG/ML
INJECTION, EMULSION INTRAVENOUS PRN
Status: DISCONTINUED | OUTPATIENT
Start: 2018-03-20 | End: 2018-03-20

## 2018-03-20 RX ORDER — LABETALOL HYDROCHLORIDE 5 MG/ML
INJECTION, SOLUTION INTRAVENOUS PRN
Status: DISCONTINUED | OUTPATIENT
Start: 2018-03-20 | End: 2018-03-20

## 2018-03-20 RX ORDER — OXYCODONE HYDROCHLORIDE 5 MG/1
5-10 TABLET ORAL
Qty: 40 TABLET | Refills: 0 | Status: SHIPPED | OUTPATIENT
Start: 2018-03-20 | End: 2018-04-11

## 2018-03-20 RX ORDER — LIDOCAINE 40 MG/G
CREAM TOPICAL
Status: DISCONTINUED | OUTPATIENT
Start: 2018-03-20 | End: 2018-03-20 | Stop reason: HOSPADM

## 2018-03-20 RX ORDER — ONDANSETRON 2 MG/ML
INJECTION INTRAMUSCULAR; INTRAVENOUS PRN
Status: DISCONTINUED | OUTPATIENT
Start: 2018-03-20 | End: 2018-03-20

## 2018-03-20 RX ORDER — NEOSTIGMINE METHYLSULFATE 1 MG/ML
VIAL (ML) INJECTION PRN
Status: DISCONTINUED | OUTPATIENT
Start: 2018-03-20 | End: 2018-03-20

## 2018-03-20 RX ORDER — DEXAMETHASONE SODIUM PHOSPHATE 4 MG/ML
INJECTION, SOLUTION INTRA-ARTICULAR; INTRALESIONAL; INTRAMUSCULAR; INTRAVENOUS; SOFT TISSUE PRN
Status: DISCONTINUED | OUTPATIENT
Start: 2018-03-20 | End: 2018-03-20

## 2018-03-20 RX ORDER — CEFAZOLIN SODIUM 2 G/100ML
2 INJECTION, SOLUTION INTRAVENOUS
Status: COMPLETED | OUTPATIENT
Start: 2018-03-20 | End: 2018-03-20

## 2018-03-20 RX ORDER — OXYCODONE HYDROCHLORIDE 5 MG/1
10 TABLET ORAL
Status: DISCONTINUED | OUTPATIENT
Start: 2018-03-20 | End: 2018-03-20 | Stop reason: HOSPADM

## 2018-03-20 RX ORDER — LIDOCAINE HYDROCHLORIDE 10 MG/ML
INJECTION, SOLUTION INFILTRATION; PERINEURAL PRN
Status: DISCONTINUED | OUTPATIENT
Start: 2018-03-20 | End: 2018-03-20

## 2018-03-20 RX ORDER — BUPIVACAINE HYDROCHLORIDE 5 MG/ML
INJECTION, SOLUTION PERINEURAL PRN
Status: DISCONTINUED | OUTPATIENT
Start: 2018-03-20 | End: 2018-03-20 | Stop reason: HOSPADM

## 2018-03-20 RX ORDER — MAGNESIUM HYDROXIDE 1200 MG/15ML
LIQUID ORAL PRN
Status: DISCONTINUED | OUTPATIENT
Start: 2018-03-20 | End: 2018-03-20 | Stop reason: HOSPADM

## 2018-03-20 RX ORDER — GLYCOPYRROLATE 0.2 MG/ML
INJECTION, SOLUTION INTRAMUSCULAR; INTRAVENOUS PRN
Status: DISCONTINUED | OUTPATIENT
Start: 2018-03-20 | End: 2018-03-20

## 2018-03-20 RX ORDER — SODIUM CHLORIDE, SODIUM LACTATE, POTASSIUM CHLORIDE, CALCIUM CHLORIDE 600; 310; 30; 20 MG/100ML; MG/100ML; MG/100ML; MG/100ML
INJECTION, SOLUTION INTRAVENOUS CONTINUOUS
Status: DISCONTINUED | OUTPATIENT
Start: 2018-03-20 | End: 2018-03-20 | Stop reason: HOSPADM

## 2018-03-20 RX ORDER — CELECOXIB 200 MG/1
400 CAPSULE ORAL
Status: COMPLETED | OUTPATIENT
Start: 2018-03-20 | End: 2018-03-20

## 2018-03-20 RX ORDER — KETAMINE HYDROCHLORIDE 10 MG/ML
INJECTION INTRAMUSCULAR; INTRAVENOUS PRN
Status: DISCONTINUED | OUTPATIENT
Start: 2018-03-20 | End: 2018-03-20

## 2018-03-20 RX ORDER — HYDROMORPHONE HYDROCHLORIDE 1 MG/ML
.3-.5 INJECTION, SOLUTION INTRAMUSCULAR; INTRAVENOUS; SUBCUTANEOUS EVERY 5 MIN PRN
Status: DISCONTINUED | OUTPATIENT
Start: 2018-03-20 | End: 2018-03-20 | Stop reason: HOSPADM

## 2018-03-20 RX ORDER — ONDANSETRON 4 MG/1
4 TABLET, ORALLY DISINTEGRATING ORAL EVERY 30 MIN PRN
Status: DISCONTINUED | OUTPATIENT
Start: 2018-03-20 | End: 2018-03-20 | Stop reason: HOSPADM

## 2018-03-20 RX ORDER — LIDOCAINE HYDROCHLORIDE AND EPINEPHRINE 10; 10 MG/ML; UG/ML
INJECTION, SOLUTION INFILTRATION; PERINEURAL PRN
Status: DISCONTINUED | OUTPATIENT
Start: 2018-03-20 | End: 2018-03-20 | Stop reason: HOSPADM

## 2018-03-20 RX ORDER — ACETAMINOPHEN 325 MG/1
975 TABLET ORAL ONCE
Status: COMPLETED | OUTPATIENT
Start: 2018-03-20 | End: 2018-03-20

## 2018-03-20 RX ORDER — FENTANYL CITRATE 50 UG/ML
25-50 INJECTION, SOLUTION INTRAMUSCULAR; INTRAVENOUS
Status: DISCONTINUED | OUTPATIENT
Start: 2018-03-20 | End: 2018-03-20 | Stop reason: HOSPADM

## 2018-03-20 RX ORDER — NALOXONE HYDROCHLORIDE 0.4 MG/ML
.1-.4 INJECTION, SOLUTION INTRAMUSCULAR; INTRAVENOUS; SUBCUTANEOUS
Status: DISCONTINUED | OUTPATIENT
Start: 2018-03-20 | End: 2018-03-20 | Stop reason: HOSPADM

## 2018-03-20 RX ORDER — ONDANSETRON 2 MG/ML
4 INJECTION INTRAMUSCULAR; INTRAVENOUS EVERY 30 MIN PRN
Status: DISCONTINUED | OUTPATIENT
Start: 2018-03-20 | End: 2018-03-20 | Stop reason: HOSPADM

## 2018-03-20 RX ORDER — OXYCODONE HYDROCHLORIDE 5 MG/1
5-10 TABLET ORAL
Status: DISCONTINUED | OUTPATIENT
Start: 2018-03-20 | End: 2018-03-20 | Stop reason: HOSPADM

## 2018-03-20 RX ORDER — CEFAZOLIN SODIUM 1 G/3ML
1 INJECTION, POWDER, FOR SOLUTION INTRAMUSCULAR; INTRAVENOUS SEE ADMIN INSTRUCTIONS
Status: DISCONTINUED | OUTPATIENT
Start: 2018-03-20 | End: 2018-03-20 | Stop reason: HOSPADM

## 2018-03-20 RX ORDER — LABETALOL HYDROCHLORIDE 5 MG/ML
10 INJECTION, SOLUTION INTRAVENOUS
Status: DISCONTINUED | OUTPATIENT
Start: 2018-03-20 | End: 2018-03-20 | Stop reason: HOSPADM

## 2018-03-20 RX ORDER — FENTANYL CITRATE 50 UG/ML
INJECTION, SOLUTION INTRAMUSCULAR; INTRAVENOUS PRN
Status: DISCONTINUED | OUTPATIENT
Start: 2018-03-20 | End: 2018-03-20

## 2018-03-20 RX ADMIN — MIDAZOLAM 2 MG: 1 INJECTION INTRAMUSCULAR; INTRAVENOUS at 12:04

## 2018-03-20 RX ADMIN — PROPOFOL 50 MG: 10 INJECTION, EMULSION INTRAVENOUS at 12:38

## 2018-03-20 RX ADMIN — ACETAMINOPHEN 975 MG: 325 TABLET, FILM COATED ORAL at 14:31

## 2018-03-20 RX ADMIN — OXYCODONE HYDROCHLORIDE 5 MG: 5 TABLET ORAL at 14:55

## 2018-03-20 RX ADMIN — BUPIVACAINE HYDROCHLORIDE 30 ML: 5 INJECTION, SOLUTION PERINEURAL at 13:21

## 2018-03-20 RX ADMIN — Medication 0.5 MG: at 14:16

## 2018-03-20 RX ADMIN — FENTANYL CITRATE 50 MCG: 50 INJECTION INTRAMUSCULAR; INTRAVENOUS at 14:11

## 2018-03-20 RX ADMIN — CELECOXIB 400 MG: 200 CAPSULE ORAL at 11:14

## 2018-03-20 RX ADMIN — Medication 0.5 MG: at 14:28

## 2018-03-20 RX ADMIN — SODIUM CHLORIDE, POTASSIUM CHLORIDE, SODIUM LACTATE AND CALCIUM CHLORIDE: 600; 310; 30; 20 INJECTION, SOLUTION INTRAVENOUS at 11:40

## 2018-03-20 RX ADMIN — DEXAMETHASONE SODIUM PHOSPHATE 4 MG: 4 INJECTION, SOLUTION INTRA-ARTICULAR; INTRALESIONAL; INTRAMUSCULAR; INTRAVENOUS; SOFT TISSUE at 12:09

## 2018-03-20 RX ADMIN — LIDOCAINE HYDROCHLORIDE AND EPINEPHRINE 30 ML: 10; 10 INJECTION, SOLUTION INFILTRATION; PERINEURAL at 13:20

## 2018-03-20 RX ADMIN — SODIUM CHLORIDE, POTASSIUM CHLORIDE, SODIUM LACTATE AND CALCIUM CHLORIDE: 600; 310; 30; 20 INJECTION, SOLUTION INTRAVENOUS at 13:11

## 2018-03-20 RX ADMIN — PROPOFOL 150 MG: 10 INJECTION, EMULSION INTRAVENOUS at 12:09

## 2018-03-20 RX ADMIN — CEFAZOLIN SODIUM 2 G: 2 INJECTION, SOLUTION INTRAVENOUS at 12:04

## 2018-03-20 RX ADMIN — KETAMINE HYDROCHLORIDE 30 MG: 10 INJECTION, SOLUTION INTRAMUSCULAR; INTRAVENOUS at 12:25

## 2018-03-20 RX ADMIN — HYDROMORPHONE HYDROCHLORIDE 1 MG: 1 INJECTION, SOLUTION INTRAMUSCULAR; INTRAVENOUS; SUBCUTANEOUS at 12:44

## 2018-03-20 RX ADMIN — GLYCOPYRROLATE 0.2 MG: 0.2 INJECTION, SOLUTION INTRAMUSCULAR; INTRAVENOUS at 12:21

## 2018-03-20 RX ADMIN — LABETALOL HYDROCHLORIDE 10 MG: 5 INJECTION, SOLUTION INTRAVENOUS at 12:39

## 2018-03-20 RX ADMIN — HYDROCODONE BITARTRATE AND ACETAMINOPHEN 150 ML: 500; 5 TABLET ORAL at 13:20

## 2018-03-20 RX ADMIN — ROCURONIUM BROMIDE 40 MG: 10 INJECTION INTRAVENOUS at 12:09

## 2018-03-20 RX ADMIN — GLYCOPYRROLATE 0.4 MG: 0.2 INJECTION, SOLUTION INTRAMUSCULAR; INTRAVENOUS at 13:24

## 2018-03-20 RX ADMIN — DEXMEDETOMIDINE HYDROCHLORIDE 0.5 MCG/KG/HR: 100 INJECTION, SOLUTION INTRAVENOUS at 12:21

## 2018-03-20 RX ADMIN — FENTANYL CITRATE 100 MCG: 50 INJECTION, SOLUTION INTRAMUSCULAR; INTRAVENOUS at 12:09

## 2018-03-20 RX ADMIN — FENTANYL CITRATE 50 MCG: 50 INJECTION INTRAMUSCULAR; INTRAVENOUS at 13:59

## 2018-03-20 RX ADMIN — ONDANSETRON 4 MG: 2 INJECTION INTRAMUSCULAR; INTRAVENOUS at 12:45

## 2018-03-20 RX ADMIN — LIDOCAINE HYDROCHLORIDE 30 MG: 10 INJECTION, SOLUTION INFILTRATION; PERINEURAL at 12:09

## 2018-03-20 RX ADMIN — Medication 3 MG: at 13:24

## 2018-03-20 NOTE — OR NURSING
MDA aware post op blood sugar 172 - also consulted for a multimodal pain management see MAR for Tylenol

## 2018-03-20 NOTE — OP NOTE
Procedure Date: 03/20/2018      PREOPERATIVE DIAGNOSES:   1.  Right shoulder with a chronic impingement syndrome.   2.  Chronic acromioclavicular joint DJD with hypertrophy.   3.  Large rotator cuff tear involving supraspinatus and infraspinatus.      POSTOPERATIVE DIAGNOSES:     1.  Right shoulder with a chronic impingement syndrome.   2.  Chronic acromioclavicular joint DJD with hypertrophy.   3.  Large rotator cuff tear involving supraspinatus and infraspinatus.   4.  Significant partial tear of biceps tendon.      PROCEDURES:   1.  Right shoulder open decompression (acromioplasty, bursectomy and coracoacromial ligament resection).   2.  Open distal clavicle resection.   3.  Repair of large rotator cuff tear (2 Linvatec Y-Knot anchors were used in addition to multiple #2 Ethibond sutures in figure-of-eight fashion).  The size of the tear was 4 cm from anterior to posterior and retraction was about 4 cm as well.   4.  Biceps tenolysis.      SURGEON:  Shiv Raygoza MD      FIRST ASSISTANT:  Kevin Drake PA-C      Assistance from the PA was necessary for the complexity of this procedure.  Because of the large size of the tear in addition to the retraction of the soft tissue for adequate exposure, manipulation of the arm was carried out, not only abduction, but also internal rotation and external rotation to manage the tear during the repair phase.  At the end of procedure, the surgical incisions were closed by the PA and post-surgical dressing and abduction brace was applied by the PA.      TYPE OF ANESTHESIA:  General anesthesia alone.        INDICATIONS FOR THE PROCEDURE:  Ms. Yvette Smallwood is currently a 72-year-old female who has had chronic shoulder pain over the last 2-1/2 months or so.  She was evaluated with MRI scan of the right shoulder, which showed a large rotator cuff tear, along with prominent hypertrophy from DJD of the acromioclavicular joint.  Mild glenohumeral joint DJD changes were also noted.  With  ongoing pain and with a large rotator cuff tear, at this point the surgical intervention of decompression and rotator cuff tear repair was felt to be the best option.  The nature of the surgery and the anticipated prolonged healing and potential complications of not healing were thoroughly explained.      DESCRIPTION OF PROCEDURE:  With satisfactory general anesthesia on modified beach chair position, the right shoulder was prepped and draped in a routine sterile fashion.      Right shoulder was exposed with a longitudinal incision from the AC joint extending distally in line with the deltoid fibers.  With subcutaneous tissue dissection, the anterior deltoid was then detached across the distal clavicle, the AC joint and anterior acromion.  At this point, it became clear that large bone spurs were present at the distal end of the clavicle anteriorly, but also inferiorly.  In addition, a sharp bone spur of the acromion was noted, with a type 2 acromion configuration.  Extravasation of the joint fluid was noted, confirming presence of rotator cuff tear as well.      With satisfactory exposure of the distal clavicle, a 1 cm distal clavicle resection was done with an oscillating saw at this time.      Attention was then paid to the subacromial decompression where acromioplasty was performed by removing a wedge shape of bone and undersurface of the acromion was smoothened.  At the time of the exposure, the coracoacromial ligament was released and subsequently resected further inferiorly.      The rotator cuff tear was exposed and a large amount of hypertrophic bursa tissue was present and this was thoroughly debrided.  The tear involved the entire supraspinatus and infraspinatus.  The biceps tendon was present, but very attenuated and inflamed and frayed.  For that reason, biceps tenolysis was performed by releasing the insertion from the superior labrum and resecting at the area of biceps groove.      We then mobilized  the rotator cuff before we resected the end of the rotator cuff to freshen the edges.  We placed a blunt instrument under the supraspinatus and infraspinatus to mobilize the rotator cuff.  We were able to mobilize enough so that we were able to bring the tissue back to the anatomic position, although mild tension was present.      With debridement onto the greater tuberosity down to the subchondral bone, 2 Y-Knot anchors were placed.  Three sutures from each anchor were then used to reestablish the medial row repair by placing horizontal mattress sutures.  The lateral margin of the rotator cuff was then tacked down using #2 Ethibond sutures in figure-of-eight fashion through the greater tuberosity bone.  Her bone was soft enough to accept needling through the bone without creating major tunnels.  A nice lateral row repair was then accomplished at this time after we placed 5 #2 Ethibond sutures.        When the arm was brought to the side of the body, there was a slight tension.  For that reason, we decided to go with an abduction pillow to ease the tension at the repair site.      After copious irrigation, the deltoid was repaired with multiple #1 Vicryl sutures in interrupted fashion.  Subsequently, the wound was closed in layers, with a final skin closure done with staples.  Estimated blood loss was slightly less than 50 mL.  The needle count and sponge count were correct at the end of the case.  No intraoperative complications noted.         EMY VILLAFANA MD             D: 2018   T: 2018   MT: MARINA      Name:     SOFIA FLANAGAN   MRN:      [id number]        Account:        [id number]   :      1945           Procedure Date: 2018      Document: [id number]

## 2018-03-20 NOTE — ANESTHESIA PREPROCEDURE EVALUATION
Anesthesia Evaluation     . Pt has had prior anesthetic. Type: General    No history of anesthetic complications          ROS/MED HX    ENT/Pulmonary:  - neg pulmonary ROS     Neurologic:     (+)neuropathy - Bilateral LE, RUE numbness,     Cardiovascular:  - neg cardiovascular ROS       METS/Exercise Tolerance:     Hematologic: Comments: No lab results found.   Lab Test        03/20/18                       1053          POTASSIUM    3.9           CR           0.46*                 Musculoskeletal:   (+) arthritis, , , other musculoskeletal- torn R rotator cuff      GI/Hepatic:  - neg GI/hepatic ROS       Renal/Genitourinary:  - ROS Renal section negative       Endo:     (+) type II DM Not using insulin Diabetic complications: neuropathy, .      Psychiatric:  - neg psychiatric ROS       Infectious Disease:  - neg infectious disease ROS       Malignancy:         Other:    - neg other ROS                 Physical Exam  Normal systems: cardiovascular, pulmonary and dental    Airway   Mallampati: II  TM distance: >3 FB  Neck ROM: full    Dental     Cardiovascular       Pulmonary                     Anesthesia Plan      History & Physical Review  History and physical reviewed and following examination; no interval change.    ASA Status:  3 .    NPO Status:  > 8 hours    Plan for General and ETT (consented for post op pain block if required) with Intravenous induction. Maintenance will be Balanced.    PONV prophylaxis:  Ondansetron (or other 5HT-3) and Dexamethasone or Solumedrol       Postoperative Care  Postoperative pain management:  IV analgesics and Oral pain medications.      Consents                          .

## 2018-03-20 NOTE — BRIEF OP NOTE
[facility]  Orthopedics Brief Operative Note    Pre-operative diagnosis: pain in joint of right shoulder   Post-operative diagnosis right shoulder impingement, AC joint DJD and large rotator cuff tear   Procedure: Procedure(s):  COMBINED ARTHROTOMY SHOULDER, ROTATOR CUFF REPAIR   Surgeon: Shiv Raygoza MD   Assistants(s): DAVID Horowitz   Anesthesia: General    Estimated blood loss: * No blood loss amount entered *                Drains: None   Specimens: None   Implants: See the dictated op note       Complications: None

## 2018-03-20 NOTE — ANESTHESIA CARE TRANSFER NOTE
Patient: Yvette Smallwood    Procedure(s):  Right shoulder open decompression, distal clavicle resection, rotator cuff tear repair      - Wound Class: I-Clean    Diagnosis: pain in joint of right shoulder  Diagnosis Additional Information: No value filed.    Anesthesia Type:   General, ETT     Note:  Airway :Face Mask  Patient transferred to:PACU  Comments: Christiane Report: Identifed the Patient, Identified the Reponsible Provider, Reviewed the pertinent medical history, Discussed the surgical course, Reviewed Intra-OP anesthesia mangement and issues during anesthesia, Set expectations for post-procedure period and Allowed opportunity for questions and acknowledgement of understanding      Vitals: (Last set prior to Anesthesia Care Transfer)    CRNA VITALS  3/20/2018 1306 - 3/20/2018 1345      3/20/2018             Pulse: 62    SpO2: 99 %    Resp Rate (observed): 16                Electronically Signed By: MARIEL Walker CRNA  March 20, 2018  1:45 PM

## 2018-03-20 NOTE — DISCHARGE INSTRUCTIONS
DR. EMY VILLAFANA M.D.      CLINIC PHONE NUMBER:  [phone]          GENERAL ANESTHESIA OR SEDATION ADULT DISCHARGE INSTRUCTIONS   SPECIAL PRECAUTIONS FOR 24 HOURS AFTER SURGERY    IT IS NOT UNUSUAL TO FEEL LIGHT-HEADED OR FAINT, UP TO 24 HOURS AFTER SURGERY OR WHILE TAKING PAIN MEDICATION.  IF YOU HAVE THESE SYMPTOMS; SIT FOR A FEW MINUTES BEFORE STANDING AND HAVE SOMEONE ASSIST YOU WHEN YOU GET UP TO WALK OR USE THE BATHROOM.    YOU SHOULD REST AND RELAX FOR THE NEXT 24 HOURS AND YOU MUST MAKE ARRANGEMENTS TO HAVE SOMEONE STAY WITH YOU FOR AT LEAST 24 HOURS AFTER YOUR DISCHARGE.  AVOID HAZARDOUS AND STRENUOUS ACTIVITIES.  DO NOT MAKE IMPORTANT DECISIONS FOR 24 HOURS.    DO NOT DRIVE ANY VEHICLE OR OPERATE MECHANICAL EQUIPMENT FOR 24 HOURS FOLLOWING THE END OF YOUR SURGERY.  EVEN THOUGH YOU MAY FEEL NORMAL, YOUR REACTIONS MAY BE AFFECTED BY THE MEDICATION YOU HAVE RECEIVED.    DO NOT DRINK ALCOHOLIC BEVERAGES FOR 24 HOURS FOLLOWING YOUR SURGERY.    DRINK CLEAR LIQUIDS (APPLE JUICE, GINGER ALE, 7-UP, BROTH, ETC.).  PROGRESS TO YOUR REGULAR DIET AS YOU FEEL ABLE.    YOU MAY HAVE A DRY MOUTH, A SORE THROAT, MUSCLES ACHES OR TROUBLE SLEEPING.  THESE SHOULD GO AWAY AFTER 24 HOURS.    CALL YOUR DOCTOR FOR ANY OF THE FOLLOWING:  SIGNS OF INFECTION (FEVER, GROWING TENDERNESS AT THE SURGERY SITE, A LARGE AMOUNT OF DRAINAGE OR BLEEDING, SEVERE PAIN, FOUL-SMELLING DRAINAGE, REDNESS OR SWELLING.    IT HAS BEEN OVER 8 TO 10 HOURS SINCE SURGERY AND YOU ARE STILL NOT ABLE TO URINATE (PASS WATER).       MEDICATIONS YOU RECEIVED:  *Maximum acetaminophen (Tylenol) dose from all sources should not exceed 4 grams (4000 mg) per day. You have had 975 mg today at 2:30 pm.    *You had one Oxycodone at 3:00pm

## 2018-03-20 NOTE — IP AVS SNAPSHOT
MRN:[id number]                      After Visit Summary   3/20/2018    Yvette Smallwood    MRN: [id number]           Thank you!     Thank you for choosing [facility] for your care. Our goal is always to provide you with excellent care. Hearing back from our patients is one way we can continue to improve our services. Please take a few minutes to complete the written survey that you may receive in the mail after you visit. If you would like to speak to someone directly about your visit please contact Patient Relations at [phone]. Thank you!          Patient Information     Date Of Birth          1945        About your hospital stay     You were admitted on:  March 20, 2018 You last received care in the:  [facility] PreOP/PostOP    You were discharged on:  March 20, 2018       Who to Call     For medical emergencies, please call 911.  For non-urgent questions about your medical care, please call your primary care provider or clinic, [phone]  For questions related to your surgery, please call your surgery clinic        Attending Provider     Provider Shiv Albarran MD Orthopedics       Primary Care Provider Office Phone # Fax #    [facility] [phone]      After Care Instructions     Discharge Instructions       Review outpatient procedure discharge instructions with patient as directed by Provider  Postop instruction following rotator cuff tear repair      1. If interscalene block is used for the surgery, Please start the pain medication Shortly after arriving home, then continue on a regular interval ( typically every six hours) for the next 24-48 hours. Titration of the pain medication can be done once numbness in the shoulder wears off. If he can tolerate the medications like ibuprofen or naproxen, It can be taken in conjunction with narcotic pain medication that was prescribed.    2. Unless differently instructed, please  leave the dressing on for three days before starting showers. Large Band-Aids or light Gauze and tape Should be used for covering the wound after each shower.    3.Frequent range of motion exercises  For the elbow, wrist and fingers should be done. It is best to do the exercises frequently throughout the day spending half a minute or a minute each time rather than one or two long sessions.    4. Apply cold for 2-3 days following the operation either using ice bags or frozen vegetables.     5. Protect the shoulder by using the sling, typically for one month from the operation.  When you're sitting the arm can be rested on the lap without the sling.    6. Depending on the size of the rotator cuff tear, the timing of starting pendulum exercises will be determined. In most cases, immediate pendulum exercises will be recommended. If the tear size is quite large, the starting time of doing the pendulum exercise will be delayed for 3-4 weeks.    7. Formal Physical therapy will start in the matter of 2-4 weeks from the time of operation depending on the size of the tear once again. The exact timing will be informed either at the time of the surgery or at the first postop visit.    8. Please not drive a vehicle while you're taking narcotic pain medication.    9. Postoperative visit should have been set up at the time of scheduling the surgery. If not sure, please contact our office ([phone]) for clarification.            Remove dressing - at 72 hours            Return to clinic       Return to clinic as scheduled                  Your next 10 appointments already scheduled     Apr 02, 2018  1:20 PM CDT   Return Visit with Delroy Drake PA-C   [facility]THOPEDIC SURGERY ([facility])    [address] [address] [address]   [phone]              Further instructions from your care team       DR. EMY VILLAFANA M.D.      CLINIC PHONE NUMBER:  [phone]           GENERAL ANESTHESIA OR SEDATION ADULT DISCHARGE INSTRUCTIONS   SPECIAL PRECAUTIONS FOR 24 HOURS AFTER SURGERY    IT IS NOT UNUSUAL TO FEEL LIGHT-HEADED OR FAINT, UP TO 24 HOURS AFTER SURGERY OR WHILE TAKING PAIN MEDICATION.  IF YOU HAVE THESE SYMPTOMS; SIT FOR A FEW MINUTES BEFORE STANDING AND HAVE SOMEONE ASSIST YOU WHEN YOU GET UP TO WALK OR USE THE BATHROOM.    YOU SHOULD REST AND RELAX FOR THE NEXT 24 HOURS AND YOU MUST MAKE ARRANGEMENTS TO HAVE SOMEONE STAY WITH YOU FOR AT LEAST 24 HOURS AFTER YOUR DISCHARGE.  AVOID HAZARDOUS AND STRENUOUS ACTIVITIES.  DO NOT MAKE IMPORTANT DECISIONS FOR 24 HOURS.    DO NOT DRIVE ANY VEHICLE OR OPERATE MECHANICAL EQUIPMENT FOR 24 HOURS FOLLOWING THE END OF YOUR SURGERY.  EVEN THOUGH YOU MAY FEEL NORMAL, YOUR REACTIONS MAY BE AFFECTED BY THE MEDICATION YOU HAVE RECEIVED.    DO NOT DRINK ALCOHOLIC BEVERAGES FOR 24 HOURS FOLLOWING YOUR SURGERY.    DRINK CLEAR LIQUIDS (APPLE JUICE, GINGER ALE, 7-UP, BROTH, ETC.).  PROGRESS TO YOUR REGULAR DIET AS YOU FEEL ABLE.    YOU MAY HAVE A DRY MOUTH, A SORE THROAT, MUSCLES ACHES OR TROUBLE SLEEPING.  THESE SHOULD GO AWAY AFTER 24 HOURS.    CALL YOUR DOCTOR FOR ANY OF THE FOLLOWING:  SIGNS OF INFECTION (FEVER, GROWING TENDERNESS AT THE SURGERY SITE, A LARGE AMOUNT OF DRAINAGE OR BLEEDING, SEVERE PAIN, FOUL-SMELLING DRAINAGE, REDNESS OR SWELLING.    IT HAS BEEN OVER 8 TO 10 HOURS SINCE SURGERY AND YOU ARE STILL NOT ABLE TO URINATE (PASS WATER).       MEDICATIONS YOU RECEIVED:  *Maximum acetaminophen (Tylenol) dose from all sources should not exceed 4 grams (4000 mg) per day. You have had 975 mg today at 2:30 pm.    *You had one Oxycodone at 3:00pm      Pending Results     No orders found from 3/18/2018 to 3/21/2018.            Admission Information     Date & Time Provider Department Dept. Phone    3/20/2018 Shiv Raygoza MD [facility] PreOP/PostOP [phone]      Your Vitals Were     Blood Pressure Temperature Respirations  "Height Weight Pulse Oximetry    154/74 97.5  F (36.4  C) (Temporal) 14 1.626 m (5' 4\") 83.1 kg (183 lb 4.8 oz) 96%    BMI (Body Mass Index)                   31.46 kg/m2           [employer] Information     [employer] lets you send messages to your doctor, view your test results, renew your prescriptions, schedule appointments and more. To sign up, go to [email or web address] . Click on \"Log in\" on the left side of the screen, which will take you to the Welcome page. Then click on \"Sign up Now\" on the right side of the page.     You will be asked to enter the access code listed below, as well as some personal information. Please follow the directions to create your username and password.     Your access code is: [email or web address]  Expires: 2018  7:26 PM     Your access code will  in 90 days. If you need help or a new code, please call your [place] clinic or [phone].        Care EveryWhere ID     This is your Care EveryWhere ID. This could be used by other organizations to access your [place] medical records  [id number]        Equal Access to Services     REYNA CURRY AH: Tonja Davies, wacurt redmond, qaradhata kaalmada aderhoda, joel rivera. So [facility] [phone].    ATENCIÓN: Si habla español, tiene a engle disposición servicios gratuitos de asistencia lingüística. Llame al [phone].    We comply with applicable federal civil rights laws and [place] laws. We do not discriminate on the basis of race, color, national origin, age, disability, sex, sexual orientation, or gender identity.               Review of your medicines      UNREVIEWED medicines. Ask your doctor about these medicines        Dose / Directions    aspirin EC 81 MG EC tablet        Dose:  162 mg   Take 162 mg by mouth daily   Refills:  0       glipiZIDE 5 MG tablet   Commonly known as:  GLUCOTROL        Dose:  5 mg   Take 5 mg by mouth   Refills:  0       metFORMIN 1000 MG tablet   Commonly " known as:  GLUCOPHAGE        Dose:  1000 mg   Take 1,000 mg by mouth   Refills:  0       MULTIVITAMIN ADULT PO        Refills:  0       RA VITAMIN B-12 TR 1000 MCG Tbcr   Generic drug:  cyanocobalamin        Dose:  1000 mcg   Take 1,000 mcg by mouth   Refills:  0       VITAMIN C PO        Refills:  0       VITAMIN E BLEND PO        Refills:  0         START taking        Dose / Directions    oxyCODONE IR 5 MG tablet   Commonly known as:  ROXICODONE   Used for:  Post-op pain        Dose:  5-10 mg   Take 1-2 tablets (5-10 mg) by mouth every 3 hours as needed for pain or other (Moderate to Severe)   Quantity:  40 tablet   Refills:  0            Where to get your medicines      Some of these will need a paper prescription and others can be bought over the counter. Ask your nurse if you have questions.     Bring a paper prescription for each of these medications     oxyCODONE IR 5 MG tablet                Protect others around you: Learn how to safely use, store and throw away your medicines at www.disposemymeds.org.        Information about OPIOIDS     PRESCRIPTION OPIOIDS: WHAT YOU NEED TO KNOW    Prescription opioids can be used to help relieve moderate to severe pain and are often prescribed following a surgery or injury, or for certain health conditions. These medications can be an important part of treatment but also come with serious risks. It is important to work with your health care provider to make sure you are getting the safest, most effective care.    WHAT ARE THE RISKS AND SIDE EFFECTS OF OPIOID USE?  Prescription opioids carry serious risks of addiction and overdose, especially with prolonged use. An opioid overdose, often marked by slowed breathing can cause sudden death. The use of prescription opioids can have a number of side effects as well, even when taken as directed:      Tolerance - meaning you might need to take more of a medication for the same pain relief    Physical dependence - meaning you  have symptoms of withdrawal when a medication is stopped    Increased sensitivity to pain    Constipation    Nausea, vomiting, and dry mouth    Sleepiness and dizziness    Confusion    Depression    Low levels of testosterone that can result in lower sex drive, energy, and strength    Itching and sweating    RISKS ARE GREATER WITH:    History of drug misuse, substance use disorder, or overdose    Mental health conditions (such as depression or anxiety)    Sleep apnea    Older age (65 years or older)    Pregnancy    Avoid alcohol while taking prescription opioids.   Also, unless specifically advised by your health care provider, medications to avoid include:    Benzodiazepines (such as Xanax or Valium)    Muscle relaxants (such as Soma or Flexeril)    Hypnotics (such as Ambien or Lunesta)    Other prescription opioids    KNOW YOUR OPTIONS:  Talk to your health care provider about ways to manage your pain that do not involve prescription opioids. Some of these options may actually work better and have fewer risks and side effects:    Pain relievers such as acetaminophen, ibuprofen, and naproxen    Some medications that are also used for depression or seizures    Physical therapy and exercise    Cognitive behavioral therapy, a psychological, goal-directed approach, in which patients learn how to modify physical, behavioral, and emotional triggers of pain and stress    IF YOU ARE PRESCRIBED OPIOIDS FOR PAIN:    Never take opioids in greater amounts or more often than prescribed    Follow up with your primary health care provider and work together to create a plan on how to manage your pain.    Talk about ways to help manage your pain that do not involve prescription opioids    Talk about all concerns and side effects    Help prevent misuse and abuse    Never sell or share prescription opioids    Never use another person's prescription opioids    Store prescription opioids in a secure place and out of reach of others  (this may include visitors, children, friends, and family)    Visit www.cdc.gov/drugoverdose to learn about risks of opioid abuse and overdose    If you believe you may be struggling with addiction, tell your health care provider and ask for guidance or call [facility]'s National Helpline at 5-326-839-HELP    LEARN MORE / www.cdc.gov/drugoverdose/prescribing/guideline.html    Safely dispose of unused prescription opioids: Find your local drug take-back programs and more information about the importance of safe disposal at www.doseofreality.mn.gov             Medication List: This is a list of all your medications and when to take them. Check marks below indicate your daily home schedule. Keep this list as a reference.      Medications           Morning Afternoon Evening Bedtime As Needed    aspirin EC 81 MG EC tablet   Take 162 mg by mouth daily                                glipiZIDE 5 MG tablet   Commonly known as:  GLUCOTROL   Take 5 mg by mouth                                metFORMIN 1000 MG tablet   Commonly known as:  GLUCOPHAGE   Take 1,000 mg by mouth                                MULTIVITAMIN ADULT PO                                oxyCODONE IR 5 MG tablet   Commonly known as:  ROXICODONE   Take 1-2 tablets (5-10 mg) by mouth every 3 hours as needed for pain or other (Moderate to Severe)   Last time this was given:  5 mg on 3/20/2018  2:55 PM                                RA VITAMIN B-12 TR 1000 MCG Tbcr   Take 1,000 mcg by mouth   Generic drug:  cyanocobalamin                                VITAMIN C PO                                VITAMIN E BLEND PO

## 2018-03-20 NOTE — IP AVS SNAPSHOT
[facility] PreOP/PostOP    [address]    [facility] [address]    Phone:  [phone]    Fax:  [phone]                                       After Visit Summary   3/20/2018    Yvette Smallwood    MRN: [id number]           After Visit Summary Signature Page     I have received my discharge instructions, and my questions have been answered. I have discussed any challenges I see with this plan with the nurse or doctor.    ..........................................................................................................................................  Patient/Patient Representative Signature      ..........................................................................................................................................  Patient Representative Print Name and Relationship to Patient    ..................................................               ................................................  Date                                            Time    ..........................................................................................................................................  Reviewed by Signature/Title    ...................................................              ..............................................  Date                                                            Time

## 2018-03-20 NOTE — ANESTHESIA POSTPROCEDURE EVALUATION
Patient: Yvette Smallwood    Procedure(s):  Right shoulder open decompression, distal clavicle resection, rotator cuff tear repair      - Wound Class: I-Clean    Diagnosis:pain in joint of right shoulder  Diagnosis Additional Information: 1.  Right shoulder with a chronic impingement syndrome.   2.  Chronic acromioclavicular joint DJD with hypertrophy.   3.  Large rotator cuff tear involving supraspinatus and infraspinatus.   4.  Significant partial tear of biceps tendon.     Anesthesia Type:  General, ETT    Note:  Anesthesia Post Evaluation    Patient location during evaluation: PACU  Patient participation: Able to fully participate in evaluation  Level of consciousness: awake  Pain management: adequate  Airway patency: patent  Cardiovascular status: acceptable  Respiratory status: acceptable  Hydration status: acceptable  PONV: controlled     Anesthetic complications: None          Last vitals:  Vitals:    03/20/18 1428 03/20/18 1430 03/20/18 1438   BP: 158/70 158/70    Resp: 27 12 18   Temp:      SpO2: 100% 97% 99%         Electronically Signed By: Cade Flores MD  March 20, 2018  2:44 PM

## 2018-03-22 ENCOUNTER — TELEPHONE (OUTPATIENT)
Dept: ORTHOPEDICS | Facility: CLINIC | Age: 73
End: 2018-03-22

## 2018-03-22 LAB — INTERPRETATION ECG - MUSE: NORMAL

## 2018-03-22 NOTE — TELEPHONE ENCOUNTER
Spoke with patient.  Doing well, no questions at this time.  Offered number for nurse triage and confirmed follow up appointment.    Reminded ov abduction pillow.    Delroy Drake PA-C  [facility] - Surgery

## 2018-04-02 ENCOUNTER — OFFICE VISIT (OUTPATIENT)
Dept: ORTHOPEDICS | Facility: CLINIC | Age: 73
End: 2018-04-02
Payer: COMMERCIAL

## 2018-04-02 DIAGNOSIS — Z98.890 S/P RIGHT ROTATOR CUFF REPAIR: ICD-10-CM

## 2018-04-02 DIAGNOSIS — Z47.89 ORTHOPEDIC AFTERCARE: Primary | ICD-10-CM

## 2018-04-02 PROCEDURE — 99024 POSTOP FOLLOW-UP VISIT: CPT | Performed by: PHYSICIAN ASSISTANT

## 2018-04-02 NOTE — PATIENT INSTRUCTIONS
Incision care instructions:  Keep dry 24 hours.  Showering is ok after that time, however no soaking or scrubbing of incision for 1 weeks.  Tape-strips will most likely fall off on their own, however they may be removed after 1-2 weeks with rubbing alcohol if they have not.    If draining or bleeding stops the tape-strips are enough coverage unless you were instructed otherwise or you would like to cover for comfort.  If drainage or bleeding continues please cover with clean dressings.     Sling WITH pillow/cushion through 4/17/2018.  Sling alone through 5/1/2018.    You may schedule the start of Physical therapy on or after may 1, 2018.  May call [phone] to schedule.    Return to clinic the last week of April.

## 2018-04-02 NOTE — MR AVS SNAPSHOT
After Visit Summary   4/2/2018    Yvette Smallwood    MRN: [id number]           Patient Information     Date Of Birth          1945        Visit Information        Provider Department      4/2/2018 3:40 PM Delroy Drake PA-C [facility] ORTHOPEDIC SURGERY        Care Instructions    Incision care instructions:  Keep dry 24 hours.  Showering is ok after that time, however no soaking or scrubbing of incision for 1 weeks.  Tape-strips will most likely fall off on their own, however they may be removed after 1-2 weeks with rubbing alcohol if they have not.    If draining or bleeding stops the tape-strips are enough coverage unless you were instructed otherwise or you would like to cover for comfort.  If drainage or bleeding continues please cover with clean dressings.     Sling WITH pillow/cushion through 4/17/2018.  Sling alone through 5/1/2018.    You may schedule the start of Physical therapy on or after may 1, 2018.  May call [phone] to schedule.    Return to clinic the last week of April.          Follow-ups after your visit        Follow-up notes from your care team     Return in about 4 weeks (around 4/30/2018).      Who to contact     If you have questions or need follow up information about today's clinic visit or your schedule please contact [facility] ORTHOPEDIC SURGERY directly at [phone].  Normal or non-critical lab and imaging results will be communicated to you by MyChart, letter or phone within 4 business days after the clinic has received the results. If you do not hear from us within 7 days, please contact the clinic through MyChart or phone. If you have a critical or abnormal lab result, we will notify you by phone as soon as possible.  Submit refill requests through [employer] or call your pharmacy and they will forward the refill request to us. Please allow 3 business days for your refill to be completed.          Additional Information About Your Visit    "     MyChart Information     [employer] lets you send messages to your doctor, view your test results, renew your prescriptions, schedule appointments and more. To sign up, go to [email or web address] . Click on \"Log in\" on the left side of the screen, which will take you to the Welcome page. Then click on \"Sign up Now\" on the right side of the page.     You will be asked to enter the access code listed below, as well as some personal information. Please follow the directions to create your username and password.     Your access code is: [email or web address]  Expires: 2018  7:26 PM     Your access code will  in 90 days. If you need help or a new code, please call your [place] clinic or [phone].        Care EveryWhere ID     This is your Care EveryWhere ID. This could be used by other organizations to access your [place] medical records  [id number]         Blood Pressure from Last 3 Encounters:   18 155/75   18 142/68   18 152/69    Weight from Last 3 Encounters:   18 183 lb 4.8 oz (83.1 kg)   18 180 lb (81.6 kg)   18 180 lb (81.6 kg)              Today, you had the following     No orders found for display       Primary Care Provider Office Phone # Fax #    [facility] [phone]       [address] [place] [address]        Equal Access to Services     REYNA CURRY AH: Hadii gianna ku hadasho Soomaali, waaxda luqadaha, qaybta kaalmada adeegyada, joel delarosa . So [facility] [place] [phone].    ATENCIÓN: Si habla español, tiene a engle disposición servicios gratuitos de asistencia lingüística. Llame al [phone].    We comply with applicable federal civil rights laws and [place] laws. We do not discriminate on the basis of race, color, national origin, age, disability, sex, sexual orientation, or gender identity.            Thank you!     Thank you for choosing [facility]  for your care. Our goal is " always to provide you with excellent care. Hearing back from our patients is one way we can continue to improve our services. Please take a few minutes to complete the written survey that you may receive in the mail after your visit with us. Thank you!             Your Updated Medication List - Protect others around you: Learn how to safely use, store and throw away your medicines at www.disposemymeds.org.          This list is accurate as of 4/2/18  4:43 PM.  Always use your most recent med list.                   Brand Name Dispense Instructions for use Diagnosis    aspirin EC 81 MG EC tablet      Take 162 mg by mouth daily        glipiZIDE 5 MG tablet    GLUCOTROL     Take 5 mg by mouth        metFORMIN 1000 MG tablet    GLUCOPHAGE     Take 1,000 mg by mouth        MULTIVITAMIN ADULT PO           oxyCODONE IR 5 MG tablet    ROXICODONE    40 tablet    Take 1-2 tablets (5-10 mg) by mouth every 3 hours as needed for pain or other (Moderate to Severe)    Post-op pain       RA VITAMIN B-12 TR 1000 MCG Tbcr   Generic drug:  cyanocobalamin      Take 1,000 mcg by mouth        VITAMIN C PO           VITAMIN E BLEND PO

## 2018-04-03 NOTE — PROGRESS NOTES
HISTORY OF PRESENT ILLNESS:    Yvette Smallwood is a 72 year old female who is seen in follow up for Right shoulder open DEC/DCR, Large RCR and biceps tenolysis, DOS 3/20/18, Dr. Raygoza.  Present symptoms: Pt reports improving pain.  Taking minimal pain medication.  Sleeping all night with oxycodone.  Using sling full time with abd pillow or supporting passively for hygiene.  No new complaints.   Denies Chest pain, Calve pain, Fever, Chills.    Current Treatment: Post op, abd sling, pain medication.    PHYSICAL EXAM:  There were no vitals taken for this visit.  There is no height or weight on file to calculate BMI.   GENERAL APPEARANCE: healthy, alert and no distress   PSYCH:  mentation appears normal and affect normal/bright    MSK:  Right: shoulder .  Present sin well fitted abd sling..  Incision clean and dry, staples present, healing.  Appropriate incisional erythema.   Yes Ecchymosis resolving. .  Axilla clean with no skin lesion.  Edema min at UE, moderate bilateral at foot through lower leg.   Non pitting, skin normal.  CMS: nadine incisional numbness, otherwise grossly intact.  AROM hand WNL.       ASSESSMENT:  Yvette Smallwood is a 72 year old female S/P Right shoulder open DEC/DCR, Large RCR and biceps tenolysis, DOS 3/20/18, Dr. Raygoza.    Doing well, compliant.    PLAN:  - Surgery discussed, images reviewed if applicable, and all questions were answered at this time.  - staples removed with sterile technique, steri-strips applied in usual fashion, care instructions given and verbally acknowledged.  - Medications: as current.  - abduction sling 4 weeks post op, then 2 weeks sling.  - no active shoulder motion.  - Physical therapy: new referral, may start around 5/1/18      Return to clinic 5, 6 weeks.    Delroy Drake PA-C    Dept. Orthopedic Surgery  [facility]   4/2/2018

## 2018-04-11 ENCOUNTER — TELEPHONE (OUTPATIENT)
Dept: ORTHOPEDICS | Facility: CLINIC | Age: 73
End: 2018-04-11

## 2018-04-11 DIAGNOSIS — G89.18 POST-OP PAIN: ICD-10-CM

## 2018-04-11 RX ORDER — OXYCODONE HYDROCHLORIDE 5 MG/1
5-10 TABLET ORAL
Qty: 40 TABLET | Refills: 0 | Status: SHIPPED | OUTPATIENT
Start: 2018-04-11

## 2018-04-11 NOTE — TELEPHONE ENCOUNTER
Called patient and notified her that prescription was left at the  for . She plans to pick it up tomorrow. Also informed her to schedule a follow up in 1 month. She stated she would also do that tomorrow.     Bing Coronel M.Ed., ATR, ATC

## 2018-04-11 NOTE — TELEPHONE ENCOUNTER
Oxycodone prescription left at the  at [facility] for patient . Patient states she will come pick it up tomorrow morning (4/12).     Bing Coronel M.Ed., ATR, ATC

## 2018-04-11 NOTE — TELEPHONE ENCOUNTER
"Spoke with the patient. She does okay in the morning however she gets uncomfortable as the days go on. She is able to sleep as long as she is still however once she begins to change positions she wakes up with pain. She has tried Tylenol and ibuprofen without relief.     Medication requested: oxycodone. 40 tablets prescribed on 3/20/2018: She is completely out of the oxycodone and states that she has been \"for awhile\"    Requesting a short course of oxycodone for continued pain.     Bing Coronel M.Ed., ATR, ATC    "

## 2018-04-11 NOTE — TELEPHONE ENCOUNTER
Reason for Call: Patient calling stating she is having pain and would like medication for the pain    Current Treatments: post op, abd sling, pain medication (oxycodone)  Treatment plan from prior OV: - Surgery discussed, images reviewed if applicable, and all questions were answered at this time.  - staples removed with sterile technique, steri-strips applied in usual fashion, care instructions given and verbally acknowledged.  - Medications: as current.  - abduction sling 4 weeks post op, then 2 weeks sling.  - no active shoulder motion.  - Physical therapy: new referral, may start around 5/1/18    Last OV date: 4/2/2018  Next OV Date: none    Patient expecting call back: Yes      Preferred contact number: Home [phone] (mobile)    Bing Coronel M.Ed., ATR, ATC

## 2018-04-11 NOTE — TELEPHONE ENCOUNTER
rx left with triage.  Does not have F/U appt, recommend around 5/10/2018.    Delroy Drake PA-C  [facility] - Surgery

## 2018-04-12 ENCOUNTER — TELEPHONE (OUTPATIENT)
Dept: ORTHOPEDICS | Facility: CLINIC | Age: 73
End: 2018-04-12

## 2018-04-12 NOTE — TELEPHONE ENCOUNTER
Received call from Jerald Pharmacist/Manger, [facility].   They received prescription yesterday for Oxycodone 5mg.   Prescription exceeds new MME (morphine/ mg/ equivalent). There was not a maximum number of tablets listed. Opioid maximum guidelines after surgery are 7 days supply or 200 MME per prescription. Maximum post op care total is 700 MME.   Sig usually is 1-2 tabs every 4-6 hrs and then a maximum number of tablets listed.   She states with current sig, maximum number of tablets is #26.   They can accept a verbal for new sig or number of tablets.     Please advise.     Routing to RIGOBERTO Horowitz RN

## 2018-04-12 NOTE — TELEPHONE ENCOUNTER
Received voicemail from [facility] stating they tried to reach us last night regarding prescription. Will try another number.     Phone call to Gail and informed we are working on but provider is seeing patients in clinic. Will call back as soon as able.     NEIL Acosta RN

## 2018-04-12 NOTE — TELEPHONE ENCOUNTER
Per Kevin Drake PA-C  Change sig to one tab every 4 hrs as needed with maximum of 6 tabs per day, #40.     Phone call to [facility] and informed of above. She states it is still too many tablets under the guidelines. Kevin Drake PA-C spoke with her and gave her sig of one tablet every 4 hrs, #26 tabs. (pt will have to possibly refill more often)    Phone call to patient had been taking one tablet at bedtime only. She does not think she got any more than 10 tabs initially and states she filled prescription at the same [facility]. Informed that [facility] is working on prescription and to contact them to see when it is ready. She verbalized understanding.     NEIL Acosta RN

## 2018-05-01 ENCOUNTER — THERAPY VISIT (OUTPATIENT)
Dept: PHYSICAL THERAPY | Facility: CLINIC | Age: 73
End: 2018-05-01
Payer: COMMERCIAL

## 2018-05-01 DIAGNOSIS — S46.011A TRAUMATIC ROTATOR CUFF TEAR, RIGHT, INITIAL ENCOUNTER: ICD-10-CM

## 2018-05-01 DIAGNOSIS — Z47.89 AFTERCARE FOLLOWING SURGERY OF THE MUSCULOSKELETAL SYSTEM: ICD-10-CM

## 2018-05-01 DIAGNOSIS — M25.511 RIGHT SHOULDER PAIN, UNSPECIFIED CHRONICITY: Primary | ICD-10-CM

## 2018-05-01 PROCEDURE — 97161 PT EVAL LOW COMPLEX 20 MIN: CPT | Mod: GP | Performed by: PHYSICAL THERAPIST

## 2018-05-01 PROCEDURE — 97110 THERAPEUTIC EXERCISES: CPT | Mod: GP | Performed by: PHYSICAL THERAPIST

## 2018-05-01 PROCEDURE — G8985 CARRY GOAL STATUS: HCPCS | Mod: GP | Performed by: PHYSICAL THERAPIST

## 2018-05-01 PROCEDURE — G8984 CARRY CURRENT STATUS: HCPCS | Mod: GP | Performed by: PHYSICAL THERAPIST

## 2018-05-01 NOTE — PROGRESS NOTES
[facility] Initial Evaluation  Subjective:  Patient is a 72 year old female presenting with rehab right shoulder hpi. The history is provided by the patient. No  was used.   Yvette Smallwood is a 72 year old female with a right shoulder (Unrestricted PLOF) condition.  Condition occurred with:  A fall (Fell in January and had large right rotator cuff repair. 3/20/18, 6 weeks ago.  D/c sling. Also R biceps tenolysis.  ).  Condition occurred: at home.  This is a new condition  DOS 3/20/18.    Patient reports pain:  Lateral (Deltoid insertion. ).    Pain is described as aching and is intermittent and reported as 3/10.  Associated symptoms:  Loss of strength and loss of motion/stiffness. Worse during: Does not affect.   Symptoms are exacerbated by using arm overhead, using arm behind back, certain positions, lying on extremity, lifting and carrying (any movement with R UE/shoulder is painful) and relieved by ice.  Since onset symptoms are unchanged.        General health as reported by patient is fair.  Pertinent medical history includes:  Diabetes and osteoarthritis.  Medical allergies: no.  Other surgeries include:  None reported.    Current occupation is Retired  .        Barriers include:  Transportation.    Red flags:  None as reported by the patient.                        Objective:  System                   Shoulder Evaluation:  ROM:  AROM:  not assessed                            PROM:    Flexion:  Right: 105      Abduction:  Right:  90      External Rotation:  Right:  20                    Strength:  not assessed                          Palpation:  not assessed                                         General     ROS    Assessment/Plan:    Patient is a 72 year old female with right side shoulder complaints.    Patient has the following significant findings with corresponding treatment plan.                Diagnosis 1:  S/p RCR, biceps tenolysis  Pain -  hot/cold therapy,  manual therapy, education and home program  Decreased ROM/flexibility - manual therapy, therapeutic exercise and home program  Decreased strength - therapeutic exercise, therapeutic activities and home program    Therapy Evaluation Codes:   1) History comprised of:   Personal factors that impact the plan of care:      None.    Comorbidity factors that impact the plan of care are:      Diabetes and Osteoarthritis.     Medications impacting care: None.  2) Examination of Body Systems comprised of:   Body structures and functions that impact the plan of care:      Shoulder.   Activity limitations that impact the plan of care are:      Bathing, Cooking, Driving, Dressing, Grasping, Lifting and Sleeping.  3) Clinical presentation characteristics are:   Stable/Uncomplicated.  4) Decision-Making    Low complexity using standardized patient assessment instrument and/or measureable assessment of functional outcome.  Cumulative Therapy Evaluation is: Low complexity.    Previous and current functional limitations:  (See Goal Flow Sheet for this information)    Short term and Long term goals: (See Goal Flow Sheet for this information)     Communication ability:  Patient appears to be able to clearly communicate and understand verbal and written communication and follow directions correctly.  Treatment Explanation - The following has been discussed with the patient:   RX ordered/plan of care  Anticipated outcomes  Possible risks and side effects  This patient would benefit from PT intervention to resume normal activities.   Rehab potential is good.    Frequency:  2 X week, once daily  Duration:  for 6 weeks  Discharge Plan:  Achieve all LTG.  Independent in home treatment program.  Return to previous functional level by discharge.  Reach maximal therapeutic benefit.    Please refer to the daily flowsheet for treatment today, total treatment time and time spent performing 1:1 timed codes.

## 2018-05-01 NOTE — MR AVS SNAPSHOT
After Visit Summary   5/1/2018    Yvette Smallwood    MRN: [id number]           Patient Information     Date Of Birth          1945        Visit Information        Provider Department      5/1/2018 9:10 AM Misael Morin PT [facility]        Today's Diagnoses     Right shoulder pain, unspecified chronicity    -  1    Aftercare following surgery of the musculoskeletal system        Traumatic rotator cuff tear, right, initial encounter           Follow-ups after your visit        Your next 10 appointments already scheduled     May 04, 2018  9:10 AM CDT   [facility] Extremity with Misael Morin PT   [facility] [place] ([facility] [place])    [address] [facility] [address]   [phone]            May 08, 2018  9:10 AM CDT   A[facility]I Extremity with Misael Morin PT   [facility] [place] ([facility] [place])    [address]  [facility] [address]   [phone]              Who to contact     If you have questions or need follow up information about today's clinic visit or your schedule please contact [facility] [place] directly at [phone].  Normal or non-critical lab and imaging results will be communicated to you by MyChart, letter or phone within 4 business days after the clinic has received the results. If you do not hear from us within 7 days, please contact the clinic through [employer] or phone. If you have a critical or abnormal lab result, we will notify you by phone as soon as possible.  Submit refill requests through [employer] or call your pharmacy and they will forward the refill request to us. Please allow 3 business days for your refill to be completed.          Additional Information About Your Visit        MyChart Information     [employer] lets you send messages to your doctor, view your test results, renew your prescriptions, schedule appointments and more. To sign up, go to [email or web address]  [email or web address] Click on \"Log in\" on the left side of the screen, which will take you to the Welcome page. Then click on \"Sign up Now\" on the right side of the page.     You will be asked to enter the access code listed below, as well as some personal information. Please follow the directions to create your username and password.     Your access code is: [email or web address]  Expires: 2018  7:26 PM     Your access code will  in 90 days. If you need help or a new code, please call your [facility] clinic or [phone].        Care EveryWhere ID     This is your Care EveryWhere ID. This could be used by other organizations to access your [facility] medical records  [id number]         Blood Pressure from Last 3 Encounters:   18 155/75   18 142/68   18 152/69    Weight from Last 3 Encounters:   18 83.1 kg (183 lb 4.8 oz)   18 81.6 kg (180 lb)   18 81.6 kg (180 lb)              We Performed the Following     HC PT EVAL, LOW COMPLEXITY     AARTI CERT REPORT     THERAPEUTIC EXERCISES        Primary Care Provider Office Phone # Fax #    Rikki [facility] [phone]       [address] [place] [address]        Equal Access to Services     REYNA CURRY : Hadramila lawsono Sodiaz, waaxda luqadaha, qaybta kaalmada adeeganand, joel delarosa . So [facility] [phone].    ATENCIÓN: Si habla español, tiene a engle disposición servicios gratuitos de asistencia lingüística. Llame al [phone].    We comply with applicable federal civil rights laws and [place] laws. We do not discriminate on the basis of race, color, national origin, age, disability, sex, sexual orientation, or gender identity.            Thank you!     Thank you for choosing [facility] [place]  for your care. Our goal is always to provide you with excellent care. Hearing back from our patients is one way we can continue to improve our services. Please take a few minutes " to complete the written survey that you may receive in the mail after your visit with us. Thank you!             Your Updated Medication List - Protect others around you: Learn how to safely use, store and throw away your medicines at www.disposemymeds.org.          This list is accurate as of 5/1/18  3:20 PM.  Always use your most recent med list.                   Brand Name Dispense Instructions for use Diagnosis    aspirin EC 81 MG EC tablet      Take 162 mg by mouth daily        glipiZIDE 5 MG tablet    GLUCOTROL     Take 5 mg by mouth        metFORMIN 1000 MG tablet    GLUCOPHAGE     Take 1,000 mg by mouth        MULTIVITAMIN ADULT PO           oxyCODONE IR 5 MG tablet    ROXICODONE    40 tablet    Take 1-2 tablets (5-10 mg) by mouth every 3 hours as needed for pain or other (Moderate to Severe)    Post-op pain       RA VITAMIN B-12 TR 1000 MCG Tbcr   Generic drug:  cyanocobalamin      Take 1,000 mcg by mouth        VITAMIN C PO           VITAMIN E BLEND PO

## 2018-05-04 ENCOUNTER — THERAPY VISIT (OUTPATIENT)
Dept: PHYSICAL THERAPY | Facility: CLINIC | Age: 73
End: 2018-05-04
Payer: COMMERCIAL

## 2018-05-04 DIAGNOSIS — M25.511 RIGHT SHOULDER PAIN, UNSPECIFIED CHRONICITY: ICD-10-CM

## 2018-05-04 DIAGNOSIS — S46.011A TRAUMATIC ROTATOR CUFF TEAR, RIGHT, INITIAL ENCOUNTER: ICD-10-CM

## 2018-05-04 DIAGNOSIS — Z47.89 AFTERCARE FOLLOWING SURGERY OF THE MUSCULOSKELETAL SYSTEM: ICD-10-CM

## 2018-05-04 PROCEDURE — 97110 THERAPEUTIC EXERCISES: CPT | Mod: GP | Performed by: PHYSICAL THERAPIST

## 2018-05-08 ENCOUNTER — THERAPY VISIT (OUTPATIENT)
Dept: PHYSICAL THERAPY | Facility: CLINIC | Age: 73
End: 2018-05-08
Payer: COMMERCIAL

## 2018-05-08 DIAGNOSIS — Z47.89 AFTERCARE FOLLOWING SURGERY OF THE MUSCULOSKELETAL SYSTEM: ICD-10-CM

## 2018-05-08 DIAGNOSIS — S46.011A TRAUMATIC ROTATOR CUFF TEAR, RIGHT, INITIAL ENCOUNTER: ICD-10-CM

## 2018-05-08 DIAGNOSIS — M25.511 RIGHT SHOULDER PAIN, UNSPECIFIED CHRONICITY: ICD-10-CM

## 2018-05-08 PROCEDURE — 97110 THERAPEUTIC EXERCISES: CPT | Mod: GP | Performed by: PHYSICAL THERAPIST

## 2018-05-14 ENCOUNTER — THERAPY VISIT (OUTPATIENT)
Dept: PHYSICAL THERAPY | Facility: CLINIC | Age: 73
End: 2018-05-14
Payer: COMMERCIAL

## 2018-05-14 DIAGNOSIS — S46.011A TRAUMATIC ROTATOR CUFF TEAR, RIGHT, INITIAL ENCOUNTER: ICD-10-CM

## 2018-05-14 DIAGNOSIS — M25.511 RIGHT SHOULDER PAIN, UNSPECIFIED CHRONICITY: ICD-10-CM

## 2018-05-14 DIAGNOSIS — Z47.89 AFTERCARE FOLLOWING SURGERY OF THE MUSCULOSKELETAL SYSTEM: ICD-10-CM

## 2018-05-14 PROCEDURE — 97110 THERAPEUTIC EXERCISES: CPT | Mod: GP | Performed by: PHYSICAL THERAPIST

## 2018-05-21 ENCOUNTER — THERAPY VISIT (OUTPATIENT)
Dept: PHYSICAL THERAPY | Facility: CLINIC | Age: 73
End: 2018-05-21
Payer: COMMERCIAL

## 2018-05-21 DIAGNOSIS — M25.511 RIGHT SHOULDER PAIN, UNSPECIFIED CHRONICITY: ICD-10-CM

## 2018-05-21 DIAGNOSIS — Z47.89 AFTERCARE FOLLOWING SURGERY OF THE MUSCULOSKELETAL SYSTEM: ICD-10-CM

## 2018-05-21 DIAGNOSIS — S46.011A TRAUMATIC ROTATOR CUFF TEAR, RIGHT, INITIAL ENCOUNTER: ICD-10-CM

## 2018-05-21 PROCEDURE — 97110 THERAPEUTIC EXERCISES: CPT | Mod: GP | Performed by: PHYSICAL THERAPIST

## 2018-05-29 ENCOUNTER — THERAPY VISIT (OUTPATIENT)
Dept: PHYSICAL THERAPY | Facility: CLINIC | Age: 73
End: 2018-05-29
Payer: COMMERCIAL

## 2018-05-29 DIAGNOSIS — M25.511 RIGHT SHOULDER PAIN, UNSPECIFIED CHRONICITY: ICD-10-CM

## 2018-05-29 DIAGNOSIS — S46.011A TRAUMATIC ROTATOR CUFF TEAR, RIGHT, INITIAL ENCOUNTER: ICD-10-CM

## 2018-05-29 DIAGNOSIS — Z47.89 AFTERCARE FOLLOWING SURGERY OF THE MUSCULOSKELETAL SYSTEM: ICD-10-CM

## 2018-05-29 PROCEDURE — 97110 THERAPEUTIC EXERCISES: CPT | Mod: GP | Performed by: PHYSICAL THERAPIST

## 2018-05-29 PROCEDURE — 97112 NEUROMUSCULAR REEDUCATION: CPT | Mod: GP | Performed by: PHYSICAL THERAPIST

## 2018-06-04 ENCOUNTER — THERAPY VISIT (OUTPATIENT)
Dept: PHYSICAL THERAPY | Facility: CLINIC | Age: 73
End: 2018-06-04
Payer: COMMERCIAL

## 2018-06-04 DIAGNOSIS — S46.011A TRAUMATIC ROTATOR CUFF TEAR, RIGHT, INITIAL ENCOUNTER: ICD-10-CM

## 2018-06-04 DIAGNOSIS — Z47.89 AFTERCARE FOLLOWING SURGERY OF THE MUSCULOSKELETAL SYSTEM: ICD-10-CM

## 2018-06-04 DIAGNOSIS — M25.511 RIGHT SHOULDER PAIN, UNSPECIFIED CHRONICITY: ICD-10-CM

## 2018-06-04 PROCEDURE — 97110 THERAPEUTIC EXERCISES: CPT | Mod: GP | Performed by: PHYSICAL THERAPIST

## 2018-06-18 ENCOUNTER — THERAPY VISIT (OUTPATIENT)
Dept: PHYSICAL THERAPY | Facility: CLINIC | Age: 73
End: 2018-06-18
Payer: COMMERCIAL

## 2018-06-18 DIAGNOSIS — M25.511 RIGHT SHOULDER PAIN, UNSPECIFIED CHRONICITY: ICD-10-CM

## 2018-06-18 DIAGNOSIS — S46.011A TRAUMATIC ROTATOR CUFF TEAR, RIGHT, INITIAL ENCOUNTER: ICD-10-CM

## 2018-06-18 DIAGNOSIS — Z47.89 AFTERCARE FOLLOWING SURGERY OF THE MUSCULOSKELETAL SYSTEM: ICD-10-CM

## 2018-06-18 PROCEDURE — 97110 THERAPEUTIC EXERCISES: CPT | Mod: GP | Performed by: PHYSICAL THERAPIST

## 2018-06-18 PROCEDURE — 97112 NEUROMUSCULAR REEDUCATION: CPT | Mod: GP | Performed by: PHYSICAL THERAPIST

## 2018-07-02 ENCOUNTER — THERAPY VISIT (OUTPATIENT)
Dept: PHYSICAL THERAPY | Facility: CLINIC | Age: 73
End: 2018-07-02
Payer: COMMERCIAL

## 2018-07-02 DIAGNOSIS — S46.011A TRAUMATIC ROTATOR CUFF TEAR, RIGHT, INITIAL ENCOUNTER: ICD-10-CM

## 2018-07-02 DIAGNOSIS — Z47.89 AFTERCARE FOLLOWING SURGERY OF THE MUSCULOSKELETAL SYSTEM: ICD-10-CM

## 2018-07-02 DIAGNOSIS — M25.511 RIGHT SHOULDER PAIN, UNSPECIFIED CHRONICITY: ICD-10-CM

## 2018-07-02 PROCEDURE — 97110 THERAPEUTIC EXERCISES: CPT | Mod: GP | Performed by: PHYSICAL THERAPIST

## 2018-07-02 PROCEDURE — 97112 NEUROMUSCULAR REEDUCATION: CPT | Mod: GP | Performed by: PHYSICAL THERAPIST

## 2018-07-24 ENCOUNTER — THERAPY VISIT (OUTPATIENT)
Dept: PHYSICAL THERAPY | Facility: CLINIC | Age: 73
End: 2018-07-24
Payer: COMMERCIAL

## 2018-07-24 DIAGNOSIS — Z47.89 AFTERCARE FOLLOWING SURGERY OF THE MUSCULOSKELETAL SYSTEM: ICD-10-CM

## 2018-07-24 DIAGNOSIS — S46.011A TRAUMATIC ROTATOR CUFF TEAR, RIGHT, INITIAL ENCOUNTER: ICD-10-CM

## 2018-07-24 DIAGNOSIS — M25.511 RIGHT SHOULDER PAIN, UNSPECIFIED CHRONICITY: ICD-10-CM

## 2018-07-24 PROCEDURE — G8985 CARRY GOAL STATUS: HCPCS | Mod: GP | Performed by: PHYSICAL THERAPIST

## 2018-07-24 PROCEDURE — G8986 CARRY D/C STATUS: HCPCS | Mod: GP | Performed by: PHYSICAL THERAPIST

## 2018-07-24 PROCEDURE — 97110 THERAPEUTIC EXERCISES: CPT | Mod: GP | Performed by: PHYSICAL THERAPIST

## 2018-07-24 NOTE — MR AVS SNAPSHOT
After Visit Summary   7/24/2018    Yvette Smallwood    MRN: [id number]           Patient Information     Date Of Birth          1945        Visit Information        Provider Department      7/24/2018 9:10 AM Misael Morin PT [facility]        Today's Diagnoses     Right shoulder pain, unspecified chronicity        Aftercare following surgery of the musculoskeletal system        Traumatic rotator cuff tear, right, initial encounter           Follow-ups after your visit        Who to contact     If you have questions or need follow up information about today's clinic visit or your schedule please contact [place] [facility] [place] directly at [phone].  Normal or non-critical lab and imaging results will be communicated to you by MyChart, letter or phone within 4 business days after the clinic has received the results. If you do not hear from us within 7 days, please contact the clinic through MyChart or phone. If you have a critical or abnormal lab result, we will notify you by phone as soon as possible.  Submit refill requests through [employer] or call your pharmacy and they will forward the refill request to us. Please allow 3 business days for your refill to be completed.          Additional Information About Your Visit        Care EveryWhere ID     This is your Care EveryWhere ID. This could be used by other organizations to access your [facility] medical records  [id number]         Blood Pressure from Last 3 Encounters:   03/20/18 155/75   02/21/18 142/68   02/12/18 152/69    Weight from Last 3 Encounters:   03/20/18 83.1 kg (183 lb 4.8 oz)   02/21/18 81.6 kg (180 lb)   02/12/18 81.6 kg (180 lb)              We Performed the Following     AARTI PROGRESS NOTES REPORT     THERAPEUTIC EXERCISES        Primary Care Provider Office Phone # Fax #    Rikki Luo [facility] [phone]       [address] [place] [address]        Equal  Access to Services     [facility] : Hadii aad ku hadanthonynaldo Salmaali, waashleighda luqkelli, qafarnaz caroletammyjoel barksdale. So [facility] [phone].    ATENCIÓN: Si andrewla saleem, tiene a engle disposición servicios gratuitos de asistencia lingüística. Llame al [phone].    We comply with applicable federal civil rights laws and [place] laws. We do not discriminate on the basis of race, color, national origin, age, disability, sex, sexual orientation, or gender identity.            Thank you!     Thank you for choosing [facility] [place]  for your care. Our goal is always to provide you with excellent care. Hearing back from our patients is one way we can continue to improve our services. Please take a few minutes to complete the written survey that you may receive in the mail after your visit with us. Thank you!             Your Updated Medication List - Protect others around you: Learn how to safely use, store and throw away your medicines at www.disposemymeds.org.          This list is accurate as of 7/24/18  9:44 AM.  Always use your most recent med list.                   Brand Name Dispense Instructions for use Diagnosis    aspirin 81 MG EC tablet      Take 162 mg by mouth daily        glipiZIDE 5 MG tablet    GLUCOTROL     Take 5 mg by mouth        metFORMIN 1000 MG tablet    GLUCOPHAGE     Take 1,000 mg by mouth        MULTIVITAMIN ADULT PO           oxyCODONE IR 5 MG tablet    ROXICODONE    40 tablet    Take 1-2 tablets (5-10 mg) by mouth every 3 hours as needed for pain or other (Moderate to Severe)    Post-op pain       RA VITAMIN B-12 TR 1000 MCG Tbcr   Generic drug:  cyanocobalamin      Take 1,000 mcg by mouth        VITAMIN C PO           VITAMIN E BLEND PO

## 2018-07-24 NOTE — PROGRESS NOTES
Subjective:  HPI                    Objective:  System    Physical Exam    General     ROS    Assessment/Plan:    DISCHARGE REPORT    Progress reporting period is from initial to 7/24.       SUBJECTIVE  Subjective changes noted by patient:  Yvette reports she is more functional with using her R arm.  She is having low level pain and pleased with her overall progress.  She is now indep with her HEP and has been able to advance to the next step in her strengthening exercises.  Pt has been instructed in her continued use of her R arm and to progress with her HEP pain free.    Current pain level is 0/10  .     Previous pain level was  3/10 Initial Pain level: 3/10.   Changes in function:  Yes (See Goal flowsheet attached for changes in current functional level)  Adverse reaction to treatment or activity: None    OBJECTIVE  Changes noted in objective findings:  Yes, Objective: R UE AROM Flexion 120, Abd 105, ER 40, AAROM Flex 130, , ER 45, Ext/IR L2     ASSESSMENT/PLAN  Updated problem list and treatment plan: Diagnosis 1:  R RCR  Pain -  hot/cold therapy  Decreased ROM/flexibility - manual therapy and therapeutic exercise  Decreased strength - therapeutic exercise and therapeutic activities  Decreased function - therapeutic activities  STG/LTGs have been met or progress has been made towards goals:  Yes (See Goal flow sheet completed today.)  Assessment of Progress: The patient's condition is improving.  Self Management Plans:  Patient has been instructed in a home treatment program.  Patient  has been instructed in self management of symptoms.  I have re-evaluated this patient and find that the nature, scope, duration and intensity of the therapy is appropriate for the medical condition of the patient.  Yvette continues to require the following intervention to meet STG and LTG's:  PT intervention is no longer required to meet STG/LTG.    Recommendations:  This patient is ready to be discharged from therapy and  continue their home treatment program.    Please refer to the daily flowsheet for treatment today, total treatment time and time spent performing 1:1 timed codes.

## 2022-08-15 NOTE — TELEPHONE ENCOUNTER
Patient picked up medication at 3:40pm on 4/11/18   [FreeTextEntry1] : Normal CBE and SBE taught and encouraged to perform monthly\par Normal pelvic exam\par GC/CT collected\par Safe sex discussed\par  I reviewed many contraceptive options in detail with patient including NuvaRing, Oral contraceptives, Depo Provera, Nexplanon, Annovera Condoms, Mirena, Kyleena and Paragard IUDs.  Risks and benefits of all of these were discussed.\par RTO x 1 year or prn\par Call provider if she would like to initiate birth control\par \par

## (undated) DEVICE — LINEN FULL SHEET 5511

## (undated) DEVICE — GLOVE PROTEXIS BLUE W/NEU-THERA 7.5  2D73EB75

## (undated) DEVICE — LINEN ORTHO ACL PACK 5447

## (undated) DEVICE — GOWN IMPERVIOUS SPECIALTY XLG/XLONG 32474

## (undated) DEVICE — DRSG GAUZE 4X4" TRAY

## (undated) DEVICE — PREP POVIDONE IODINE SOLUTION 10% 120ML

## (undated) DEVICE — SU ETHIBOND 2 V-37 4X30" MX69G

## (undated) DEVICE — SU NDL MAYO 1824-6

## (undated) DEVICE — DRSG ADAPTIC 3X8" 6113

## (undated) DEVICE — PACK SHOULDER RIDGES

## (undated) DEVICE — SU VICRYL 1 MO-4 18" J702D

## (undated) DEVICE — ISOSORB1500

## (undated) DEVICE — GLOVE PROTEXIS BLUE W/NEU-THERA 7.0  2D73EB70

## (undated) DEVICE — GLOVE PROTEXIS POWDER FREE 7.5 ORTHOPEDIC 2D73ET75

## (undated) DEVICE — SU ETHIBOND 0 MO-7 CR 8X18" CX41D

## (undated) DEVICE — SUCTION CANISTER MEDIVAC LINER 3000ML W/LID 65651-530

## (undated) DEVICE — BLADE SAW OSCIL/SAG STRK MICRO 9X25X0.64MM 2296-033-522

## (undated) DEVICE — GLOVE PROTEXIS POWDER FREE SMT 8.5 2D72PT85X

## (undated) DEVICE — ESU GROUND PAD ADULT W/CORD E7507

## (undated) DEVICE — STPL SKIN 35W APPOSE 8886803712

## (undated) DEVICE — SU VICRYL 3-0 SH 27" UND J416H

## (undated) DEVICE — BLADE KNIFE SURG 10 371110

## (undated) DEVICE — LINEN HALF SHEET 5512

## (undated) DEVICE — ESU PENCIL W/HOLSTER E2350H

## (undated) DEVICE — NDL ARTHREX MULTIFIRE/FASTPASS SCORPION AR-13995N

## (undated) DEVICE — DRSG ABDOMINAL 07 1/2X8" 7197D

## (undated) DEVICE — SOL NACL 0.9% IRRIG 1000ML BOTTLE 2F7124

## (undated) DEVICE — DRAPE SHOULDER PACK SPLITS 29365

## (undated) DEVICE — BAG CLEAR TRASH 1.3M 39X33" P4040C

## (undated) DEVICE — IMM SHOULDER  ABDUCT ULTRASLING III MED 11-0449-3

## (undated) DEVICE — GLOVE PROTEXIS BLUE W/NEU-THERA 8.0  2D73EB80

## (undated) RX ORDER — HYDROMORPHONE HYDROCHLORIDE 1 MG/ML
INJECTION, SOLUTION INTRAMUSCULAR; INTRAVENOUS; SUBCUTANEOUS
Status: DISPENSED
Start: 2018-03-20

## (undated) RX ORDER — GLYCOPYRROLATE 0.2 MG/ML
INJECTION INTRAMUSCULAR; INTRAVENOUS
Status: DISPENSED
Start: 2018-03-20

## (undated) RX ORDER — FENTANYL CITRATE 50 UG/ML
INJECTION, SOLUTION INTRAMUSCULAR; INTRAVENOUS
Status: DISPENSED
Start: 2018-03-20

## (undated) RX ORDER — LIDOCAINE HYDROCHLORIDE AND EPINEPHRINE 10; 10 MG/ML; UG/ML
INJECTION, SOLUTION INFILTRATION; PERINEURAL
Status: DISPENSED
Start: 2018-03-20

## (undated) RX ORDER — NEOSTIGMINE METHYLSULFATE 1 MG/ML
VIAL (ML) INJECTION
Status: DISPENSED
Start: 2018-03-20

## (undated) RX ORDER — CELECOXIB 200 MG/1
CAPSULE ORAL
Status: DISPENSED
Start: 2018-03-20

## (undated) RX ORDER — KETAMINE HYDROCHLORIDE 10 MG/ML
INJECTION INTRAMUSCULAR; INTRAVENOUS
Status: DISPENSED
Start: 2018-03-20

## (undated) RX ORDER — PROPOFOL 10 MG/ML
INJECTION, EMULSION INTRAVENOUS
Status: DISPENSED
Start: 2018-03-20

## (undated) RX ORDER — BUPIVACAINE HYDROCHLORIDE 5 MG/ML
INJECTION, SOLUTION EPIDURAL; INTRACAUDAL
Status: DISPENSED
Start: 2018-03-20

## (undated) RX ORDER — OXYCODONE HYDROCHLORIDE 5 MG/1
TABLET ORAL
Status: DISPENSED
Start: 2018-03-20

## (undated) RX ORDER — LIDOCAINE HYDROCHLORIDE 10 MG/ML
INJECTION, SOLUTION EPIDURAL; INFILTRATION; INTRACAUDAL; PERINEURAL
Status: DISPENSED
Start: 2018-03-20

## (undated) RX ORDER — ACETAMINOPHEN 325 MG/1
TABLET ORAL
Status: DISPENSED
Start: 2018-03-20

## (undated) RX ORDER — EPHEDRINE SULFATE 50 MG/ML
INJECTION, SOLUTION INTRAMUSCULAR; INTRAVENOUS; SUBCUTANEOUS
Status: DISPENSED
Start: 2018-03-20

## (undated) RX ORDER — ONDANSETRON 2 MG/ML
INJECTION INTRAMUSCULAR; INTRAVENOUS
Status: DISPENSED
Start: 2018-03-20

## (undated) RX ORDER — CEFAZOLIN SODIUM 2 G/100ML
INJECTION, SOLUTION INTRAVENOUS
Status: DISPENSED
Start: 2018-03-20

## (undated) RX ORDER — DEXAMETHASONE SODIUM PHOSPHATE 4 MG/ML
INJECTION, SOLUTION INTRA-ARTICULAR; INTRALESIONAL; INTRAMUSCULAR; INTRAVENOUS; SOFT TISSUE
Status: DISPENSED
Start: 2018-03-20